# Patient Record
Sex: FEMALE | Race: WHITE | NOT HISPANIC OR LATINO | Employment: UNEMPLOYED | URBAN - METROPOLITAN AREA
[De-identification: names, ages, dates, MRNs, and addresses within clinical notes are randomized per-mention and may not be internally consistent; named-entity substitution may affect disease eponyms.]

---

## 2018-01-29 ENCOUNTER — OFFICE VISIT (OUTPATIENT)
Dept: INTERNAL MEDICINE | Facility: CLINIC | Age: 68
End: 2018-01-29
Payer: COMMERCIAL

## 2018-01-29 VITALS
HEIGHT: 68 IN | RESPIRATION RATE: 18 BRPM | TEMPERATURE: 98 F | WEIGHT: 134 LBS | OXYGEN SATURATION: 98 % | DIASTOLIC BLOOD PRESSURE: 72 MMHG | SYSTOLIC BLOOD PRESSURE: 110 MMHG | HEART RATE: 86 BPM | BODY MASS INDEX: 20.31 KG/M2

## 2018-01-29 DIAGNOSIS — M89.9 BONE DISEASE: Primary | ICD-10-CM

## 2018-01-29 DIAGNOSIS — Z12.11 SCREEN FOR COLON CANCER: ICD-10-CM

## 2018-01-29 DIAGNOSIS — F90.1 ATTENTION DEFICIT HYPERACTIVITY DISORDER (ADHD), PREDOMINANTLY HYPERACTIVE TYPE: ICD-10-CM

## 2018-01-29 DIAGNOSIS — R53.83 OTHER FATIGUE: ICD-10-CM

## 2018-01-29 DIAGNOSIS — Z00.00 ROUTINE MEDICAL EXAM: ICD-10-CM

## 2018-01-29 DIAGNOSIS — G44.229 CHRONIC TENSION-TYPE HEADACHE, NOT INTRACTABLE: ICD-10-CM

## 2018-01-29 DIAGNOSIS — Z00.00 GENERAL MEDICAL EXAM: ICD-10-CM

## 2018-01-29 DIAGNOSIS — E03.9 ACQUIRED HYPOTHYROIDISM: ICD-10-CM

## 2018-01-29 DIAGNOSIS — F06.4 ANXIETY DISORDER DUE TO KNOWN PHYSIOLOGICAL CONDITION: ICD-10-CM

## 2018-01-29 DIAGNOSIS — Z78.9 STATIN INTOLERANCE: ICD-10-CM

## 2018-01-29 DIAGNOSIS — E78.2 MIXED HYPERLIPIDEMIA: ICD-10-CM

## 2018-01-29 DIAGNOSIS — F51.01 PRIMARY INSOMNIA: ICD-10-CM

## 2018-01-29 DIAGNOSIS — I95.1 ORTHOSTASIS: ICD-10-CM

## 2018-01-29 DIAGNOSIS — Z12.39 BREAST CANCER SCREENING: ICD-10-CM

## 2018-01-29 PROCEDURE — 99213 OFFICE O/P EST LOW 20 MIN: CPT | Mod: ,,, | Performed by: INTERNAL MEDICINE

## 2018-01-29 RX ORDER — DEXTROAMPHETAMINE SACCHARATE, AMPHETAMINE ASPARTATE, DEXTROAMPHETAMINE SULFATE AND AMPHETAMINE SULFATE 5; 5; 5; 5 MG/1; MG/1; MG/1; MG/1
2 TABLET ORAL 2 TIMES DAILY
COMMUNITY
Start: 2018-01-18 | End: 2018-01-29 | Stop reason: SDUPTHER

## 2018-01-29 RX ORDER — LORAZEPAM 2 MG/1
TABLET ORAL
Refills: 5 | COMMUNITY
Start: 2018-01-16 | End: 2019-07-25

## 2018-01-29 RX ORDER — ALPRAZOLAM 2 MG/1
TABLET ORAL
COMMUNITY
Start: 2018-01-16 | End: 2018-06-04

## 2018-01-29 RX ORDER — CEVIMELINE HYDROCHLORIDE 30 MG/1
CAPSULE ORAL DAILY
Refills: 0 | COMMUNITY
Start: 2017-12-27

## 2018-01-29 RX ORDER — ZOLPIDEM TARTRATE 3.5 MG/1
TABLET SUBLINGUAL
Refills: 0 | COMMUNITY
Start: 2017-12-27 | End: 2018-06-04

## 2018-01-29 RX ORDER — DEXTROAMPHETAMINE SACCHARATE, AMPHETAMINE ASPARTATE, DEXTROAMPHETAMINE SULFATE AND AMPHETAMINE SULFATE 5; 5; 5; 5 MG/1; MG/1; MG/1; MG/1
2 TABLET ORAL 2 TIMES DAILY
Qty: 120 TABLET | Refills: 0 | Status: SHIPPED | OUTPATIENT
Start: 2018-01-29 | End: 2023-06-04

## 2018-01-29 RX ORDER — MIDODRINE HYDROCHLORIDE 5 MG/1
5 TABLET ORAL 2 TIMES DAILY WITH MEALS
Refills: 0 | COMMUNITY
Start: 2017-12-06 | End: 2019-07-25

## 2018-01-29 RX ORDER — CYANOCOBALAMIN 1000 UG/ML
INJECTION, SOLUTION INTRAMUSCULAR; SUBCUTANEOUS
Refills: 0 | COMMUNITY
Start: 2017-12-05 | End: 2019-05-30

## 2018-01-29 RX ORDER — BUTALBITAL, ACETAMINOPHEN AND CAFFEINE 300; 40; 50 MG/1; MG/1; MG/1
CAPSULE ORAL
COMMUNITY
End: 2018-06-04

## 2018-01-29 RX ORDER — TOPIRAMATE 100 MG/1
50 TABLET, FILM COATED ORAL NIGHTLY
Refills: 0 | COMMUNITY
Start: 2018-01-08 | End: 2019-10-24

## 2018-01-29 RX ORDER — CEFUROXIME AXETIL 250 MG/1
TABLET ORAL DAILY PRN
Refills: 0 | COMMUNITY
Start: 2017-12-27

## 2018-01-29 NOTE — PROGRESS NOTES
SUBJECTIVE:    Patient ID: Norris Frank is a 67 y.o. female.    Chief Complaint: Follow-up (3 months) and Medication Refill    HPI     IN FOR RECHECK-HISTORY OF ADHD WITH SEVERE FATIGUE--SINCE LAST OV SHE STARTED HORMONES-TESTOSTERONE AND SHE HAS FELT BETTER AND BEEN MORE ACTIVE-SHE HAS DECREASED THE ADERALL FROM 120 MG TO 80-SHE SAYS SHE MIGHT BE ABLE TO GO DOWN ANOTHER 20 BUT SHE IS NOT SURE-    SHE IS WORKING OUT WITH A [employer] AND HE IS WORKING ON HIS PHD IN NUTRITION-SHE IS VERY INVOLVED IN THE TREATMENT    Past Medical History:   Diagnosis Date    ADHD (attention deficit hyperactivity disorder)     Hypothyroidism      Social History     Social History    Marital status: Single     Spouse name: N/A    Number of children: N/A    Years of education: N/A     Occupational History    Not on file.     Social History Main Topics    Smoking status: Never Smoker    Smokeless tobacco: Never Used    Alcohol use No    Drug use: No    Sexual activity: Not on file     Other Topics Concern    Not on file     Social History Narrative    No narrative on file     Past Surgical History:   Procedure Laterality Date     SECTION      CHOLECYSTECTOMY       History reviewed. No pertinent family history.    Review of Systems   Constitutional: Negative for appetite change, chills, diaphoresis, fatigue, fever and unexpected weight change.   HENT: Negative for congestion, ear pain, hearing loss, nosebleeds, postnasal drip, sinus pain, sinus pressure, sneezing, sore throat, tinnitus, trouble swallowing and voice change.    Eyes: Negative for photophobia, pain, itching and visual disturbance.   Respiratory: Negative for apnea, cough, chest tightness, shortness of breath, wheezing and stridor.    Cardiovascular: Negative for chest pain, palpitations and leg swelling.   Gastrointestinal: Negative for abdominal distention, abdominal pain, blood in stool, constipation, diarrhea, nausea and vomiting.   Endocrine:  Negative for cold intolerance, heat intolerance, polydipsia and polyuria.   Genitourinary: Negative for difficulty urinating, dyspareunia, dysuria, flank pain, frequency, hematuria, menstrual problem, pelvic pain, urgency, vaginal discharge and vaginal pain.   Musculoskeletal: Negative for arthralgias, back pain, joint swelling, myalgias, neck pain and neck stiffness.   Skin: Negative for pallor.   Allergic/Immunologic: Negative for environmental allergies and food allergies.   Neurological: Negative for dizziness, tremors, speech difficulty, weakness, light-headedness and numbness.   Hematological: Does not bruise/bleed easily.   Psychiatric/Behavioral: Negative for agitation, confusion, decreased concentration, sleep disturbance and suicidal ideas. The patient is not nervous/anxious.           Objective:      Physical Exam   Constitutional: She is oriented to person, place, and time. She appears well-developed and well-nourished. She is cooperative. No distress.   HENT:   Head: Normocephalic and atraumatic.   Right Ear: Tympanic membrane normal.   Left Ear: Tympanic membrane normal.   Mouth/Throat: Uvula is midline and mucous membranes are normal.   Eyes: Conjunctivae, EOM and lids are normal. Pupils are equal, round, and reactive to light. Right pupil is round and reactive. Left pupil is round and reactive.   Neck: Trachea normal and normal range of motion. Neck supple. No JVD present. No thyromegaly present.   Cardiovascular: Normal rate, regular rhythm, normal heart sounds and intact distal pulses.    Pulmonary/Chest: Effort normal and breath sounds normal. No tachypnea. No respiratory distress.   Abdominal: Soft. Bowel sounds are normal. There is no tenderness.   Musculoskeletal: Normal range of motion.   Lymphadenopathy:     She has no cervical adenopathy.   Neurological: She is alert and oriented to person, place, and time. She has normal strength.   Skin: Skin is warm and dry. No rash noted.   Psychiatric:  She has a normal mood and affect. Her speech is normal.   Nursing note and vitals reviewed.          Assessment:       1. Bone disease    2. Statin intolerance    3. Breast cancer screening    4. General medical exam    5. Mixed hyperlipidemia    6. Acquired hypothyroidism    7. Screen for colon cancer    8. Routine medical exam    9. Anxiety disorder due to known physiological condition    10. Chronic tension-type headache, not intractable    11. Other fatigue    12. Attention deficit hyperactivity disorder (ADHD), predominantly hyperactive type    13. Orthostasis    14. Primary insomnia         Plan:           Bone disease  -     DXA Bone Density Spine And Hip    Statin intolerance    Breast cancer screening  -     Mammo Digital Screening Bilat without CA    General medical exam  -     CBC auto differential; Future; Expected date: 02/05/2018  -     Comprehensive metabolic panel; Future; Expected date: 02/05/2018  -     Lipid panel; Future; Expected date: 02/05/2018    Mixed hyperlipidemia  -     Lipid panel; Future; Expected date: 02/05/2018    Acquired hypothyroidism  -     TSH; Future; Expected date: 02/05/2018    Screen for colon cancer  -     Occult blood x 1, stool; Future; Expected date: 02/05/2018    Routine medical exam  -     Urinalysis; Future; Expected date: 02/05/2018    Anxiety disorder due to known physiological condition    Chronic tension-type headache, not intractable    Other fatigue    Attention deficit hyperactivity disorder (ADHD), predominantly hyperactive type  -     dextroamphetamine-amphetamine (ADDERALL) 20 mg tablet; Take 2 tablets by mouth 2 (two) times daily.  Dispense: 120 tablet; Refill: 0    Orthostasis    Primary insomnia

## 2018-06-04 ENCOUNTER — OFFICE VISIT (OUTPATIENT)
Dept: INTERNAL MEDICINE | Facility: CLINIC | Age: 68
End: 2018-06-04
Payer: COMMERCIAL

## 2018-06-04 VITALS
RESPIRATION RATE: 16 BRPM | HEART RATE: 78 BPM | TEMPERATURE: 98 F | WEIGHT: 129 LBS | HEIGHT: 68 IN | DIASTOLIC BLOOD PRESSURE: 68 MMHG | OXYGEN SATURATION: 98 % | BODY MASS INDEX: 19.55 KG/M2 | SYSTOLIC BLOOD PRESSURE: 100 MMHG

## 2018-06-04 DIAGNOSIS — Z83.49 FAMILY HISTORY OF ADDISON'S DISEASE: ICD-10-CM

## 2018-06-04 DIAGNOSIS — R63.4 WEIGHT LOSS: Primary | ICD-10-CM

## 2018-06-04 DIAGNOSIS — E78.2 MIXED HYPERLIPIDEMIA: ICD-10-CM

## 2018-06-04 DIAGNOSIS — R23.3 EASY BRUISING: ICD-10-CM

## 2018-06-04 DIAGNOSIS — R53.83 FATIGUE, UNSPECIFIED TYPE: ICD-10-CM

## 2018-06-04 DIAGNOSIS — Z80.0 FAMILY HISTORY OF COLON CANCER: ICD-10-CM

## 2018-06-04 DIAGNOSIS — Z86.79 HISTORY OF ORTHOSTATIC HYPOTENSION: ICD-10-CM

## 2018-06-04 PROCEDURE — 99214 OFFICE O/P EST MOD 30 MIN: CPT | Mod: ,,, | Performed by: INTERNAL MEDICINE

## 2018-06-04 RX ORDER — HYDROCORTISONE 5 MG/1
5 TABLET ORAL 2 TIMES DAILY
COMMUNITY
End: 2019-07-25

## 2018-06-04 RX ORDER — MULTIVITAMIN
1 TABLET ORAL DAILY
COMMUNITY

## 2018-06-04 RX ORDER — DESVENLAFAXINE 100 MG/1
100 TABLET, EXTENDED RELEASE ORAL DAILY
COMMUNITY
End: 2019-05-30

## 2018-06-04 RX ORDER — HYDROXYZINE PAMOATE 50 MG/1
100 CAPSULE ORAL DAILY
Status: ON HOLD | COMMUNITY
End: 2018-08-21 | Stop reason: CLARIF

## 2018-06-04 RX ORDER — CALCIUM CARBONATE 600 MG
600 TABLET ORAL ONCE
Status: ON HOLD | COMMUNITY
End: 2023-04-07

## 2018-06-04 RX ORDER — VENLAFAXINE 37.5 MG/1
37.5 TABLET ORAL DAILY
COMMUNITY
End: 2019-05-30

## 2018-06-04 NOTE — PROGRESS NOTES
SUBJECTIVE:    Patient ID: Norris Frank is a 68 y.o. female.    Chief Complaint: Weight Loss; Orders; and Anorexia    HPI     Patient comes in for recheck    Weight loss-recent-she was down to 125 consistently-she has obvious bruising on the arms she says is improving-says her father had bruising for years-but this started suddenly-she started noticing slow wound healing-never before ( has been noncompliant in the past to orders given for labs-explained she HAS to have these labs, particularly now)-she has had a bit of fatigue which comes and goes-she stopped playing tennis last week-stopped weight lifting last week also-suddenly she stopped-loss of appetite is strange- gets nauseated after she eats-on several occasions nausea led to need to go to bed-had slight lower abdominal cramping intermittently-notices no blood in stool and stool is not dark-no recent RT sx-she thinks she is headed towards one of two issues in her family-colon cancer-[place]' which involve second cousins-25 years ago she developed severe malnutrition associated with abdominal protuberance-she was treated in [place] -they found ablution due decreased pancreatic enzymes and adrenal insufficiency-At this time she does not eat due to nausea after eating-she wants to eat -just cannot  Past Medical History:   Diagnosis Date    ADHD (attention deficit hyperactivity disorder)     Hypothyroidism      Social History     Social History    Marital status:      Spouse name: N/A    Number of children: N/A    Years of education: N/A     Occupational History    Not on file.     Social History Main Topics    Smoking status: Never Smoker    Smokeless tobacco: Never Used    Alcohol use No    Drug use: No    Sexual activity: Not on file     Other Topics Concern    Not on file     Social History Narrative    No narrative on file     Past Surgical History:   Procedure Laterality Date     SECTION      CHOLECYSTECTOMY        History reviewed. No pertinent family history.    Review of Systems   Constitutional: Negative for appetite change, chills, diaphoresis, fatigue, fever and unexpected weight change.   HENT: Negative for congestion, ear pain, hearing loss, nosebleeds, postnasal drip, sinus pain, sinus pressure, sneezing, sore throat, tinnitus, trouble swallowing and voice change.    Eyes: Negative for photophobia, pain, itching and visual disturbance.   Respiratory: Negative for apnea, cough, chest tightness, shortness of breath, wheezing and stridor.    Cardiovascular: Negative for chest pain, palpitations and leg swelling.        She is now on midodrine-due to orthostatic hypotension   Gastrointestinal: Negative for abdominal distention, abdominal pain, blood in stool, constipation, diarrhea, nausea and vomiting.   Endocrine: Negative for cold intolerance, heat intolerance, polydipsia and polyuria.        BP is low   Genitourinary: Negative for difficulty urinating, dyspareunia, dysuria, flank pain, frequency, hematuria, menstrual problem, pelvic pain, urgency, vaginal discharge and vaginal pain.   Musculoskeletal: Negative for arthralgias, back pain, joint swelling, myalgias, neck pain and neck stiffness.   Skin: Negative for pallor.   Allergic/Immunologic: Negative for environmental allergies and food allergies.   Neurological: Negative for dizziness, tremors, speech difficulty, weakness (no stamina), light-headedness and numbness.   Hematological: Bruises/bleeds easily (6 months).   Psychiatric/Behavioral: Negative for agitation, confusion, decreased concentration, sleep disturbance and suicidal ideas. The patient is not nervous/anxious.         ADHD with hx severe fatigue-started cortef-by            Objective:      Physical Exam   Constitutional: She is oriented to person, place, and time. She appears well-developed. She is cooperative. No distress.   thin   HENT:   Head: Normocephalic and atraumatic.   Right Ear:  Tympanic membrane normal.   Left Ear: Tympanic membrane normal.   Mouth/Throat: Uvula is midline and mucous membranes are normal.   Eyes: Conjunctivae, EOM and lids are normal. Pupils are equal, round, and reactive to light. Right pupil is round and reactive. Left pupil is round and reactive.   Neck: Trachea normal and normal range of motion. Neck supple. No JVD present. No thyromegaly present.   Cardiovascular: Normal rate, regular rhythm, normal heart sounds and intact distal pulses.    Pulmonary/Chest: Effort normal and breath sounds normal. No tachypnea. No respiratory distress.   Abdominal: Soft. Bowel sounds are normal. There is no tenderness.   Musculoskeletal: Normal range of motion.   Lymphadenopathy:     She has no cervical adenopathy.   Neurological: She is alert and oriented to person, place, and time. She has normal strength.   Skin: Skin is warm and dry. No rash noted.   Psychiatric: She has a normal mood and affect. Her speech is normal.   Nursing note and vitals reviewed.          Assessment:       1. Weight loss    2. Easy bruising    3. Family history of colon cancer    4. Family history of Roby's disease    5. History of orthostatic hypotension    6. Mixed hyperlipidemia    7. Fatigue, unspecified type         Plan:           Weight loss  -     Ambulatory referral to Gastroenterology  -     CBC auto differential; Future; Expected date: 06/04/2018  -     Comprehensive metabolic panel; Future; Expected date: 06/04/2018  -     TSH; Future; Expected date: 06/04/2018  -     Urinalysis Microscopic  -     Sedimentation rate, automated; Future; Expected date: 06/04/2018  -     C-reactive protein; Future; Expected date: 06/04/2018  -     ACTH; Future; Expected date: 06/04/2018  -     X-Ray Chest PA And Lateral  -     Vitamin D; Future; Expected date: 06/05/2018  -     Amylase; Future; Expected date: 06/04/2018  -     Lipase; Future; Expected date: 06/04/2018    Easy bruising  -     Ambulatory referral  to Gastroenterology  -     CBC auto differential; Future; Expected date: 06/04/2018  -     Comprehensive metabolic panel; Future; Expected date: 06/04/2018  -     Sedimentation rate, automated; Future; Expected date: 06/04/2018  -     C-reactive protein; Future; Expected date: 06/04/2018  -     ACTH; Future; Expected date: 06/04/2018  -     Vitamin D; Future; Expected date: 06/05/2018    Family history of colon cancer  -     Ambulatory referral to Gastroenterology    Family history of Roby's disease  -     CBC auto differential; Future; Expected date: 06/04/2018  -     Cortisol, 8AM; Future; Expected date: 06/05/2018  -     Cortisol, 4PM; Future; Expected date: 06/05/2018  -     Cortisol, urine, free [facility]); 24 Hours; Future; Expected date: 06/05/2018    History of orthostatic hypotension  -     CBC auto differential; Future; Expected date: 06/04/2018  -     Comprehensive metabolic panel; Future; Expected date: 06/04/2018    Mixed hyperlipidemia  -     Lipid panel; Future; Expected date: 06/04/2018    Fatigue, unspecified type  -     Vitamin D; Future; Expected date: 06/05/2018

## 2018-06-15 LAB
ALBUMIN SERPL-MCNC: 4.3 G/DL (ref 3.1–4.7)
ALP SERPL-CCNC: 77 IU/L (ref 40–104)
ALT (SGPT): 22 IU/L (ref 3–33)
AMYLASE SERPL-CCNC: 70 U/L (ref 28–100)
AST SERPL-CCNC: 25 IU/L (ref 10–40)
BASOPHILS NFR BLD: 0 K/UL (ref 0–0.2)
BASOPHILS NFR BLD: 0.6 %
BILIRUB SERPL-MCNC: 0.3 MG/DL (ref 0.3–1)
BUN SERPL-MCNC: 17 MG/DL (ref 8–20)
CALCIUM SERPL-MCNC: 9 MG/DL (ref 7.7–10.4)
CHLORIDE: 105 MMOL/L (ref 98–110)
CO2 SERPL-SCNC: 27.4 MMOL/L (ref 22.8–31.6)
CREATININE: 0.8 MG/DL (ref 0.6–1.4)
CRP SERPL-MCNC: 0.78 MG/DL (ref 0–1.4)
EOSINOPHIL NFR BLD: 0.2 K/UL (ref 0–0.7)
EOSINOPHIL NFR BLD: 5.4 %
ERYTHROCYTE [DISTWIDTH] IN BLOOD BY AUTOMATED COUNT: 13.4 % (ref 12.5–14.5)
GLUCOSE: 102 MG/DL (ref 70–99)
GRAN #: 1 K/UL (ref 1.4–6.5)
GRAN%: 29.7 %
HCT VFR BLD AUTO: 37 % (ref 36–48)
HGB BLD-MCNC: 12.4 G/DL (ref 12–15)
IMMATURE GRANS (ABS): 0 K/UL (ref 0–1)
IMMATURE GRANULOCYTES: 0.6 %
LIPASE SERPL-CCNC: 20 U/L (ref 0–38)
LYMPH #: 2 K/UL (ref 1.2–3.4)
LYMPH%: 56.3 %
MCH RBC QN AUTO: 33.1 PG (ref 25–35)
MCHC RBC AUTO-ENTMCNC: 33.5 G/DL (ref 31–36)
MCV RBC AUTO: 98.7 FL (ref 79–98)
MONO #: 0.3 K/UL (ref 0.1–0.6)
MONO%: 7.4 %
NUCLEATED RBCS: 0 %
NUCLEATED RED BLOOD CELLS: 0 /100 WBC
PERFORMED BY:: ABNORMAL
PLATELET # BLD AUTO: 105 K/UL (ref 140–440)
PMV BLD AUTO: 11 FL (ref 8.8–12.7)
POTASSIUM SERPL-SCNC: 3.7 MMOL/L (ref 3.5–5)
PROT SERPL-MCNC: 7.1 G/DL (ref 6–8.2)
RBC # BLD AUTO: 3.75 M/UL (ref 3.5–5.5)
SODIUM: 140 MMOL/L (ref 134–144)
TSH SERPL DL<=0.005 MIU/L-ACNC: 2.4 ULU/ML (ref 0.3–5.6)
VITAMIN D, 1,25 (OH)2: 55.6 NG/ML (ref 30–100)
WBC # BLD: 3.5 K/UL (ref 5–10)

## 2018-06-20 ENCOUNTER — TELEPHONE (OUTPATIENT)
Dept: INTERNAL MEDICINE | Facility: CLINIC | Age: 68
End: 2018-06-20

## 2018-06-20 NOTE — TELEPHONE ENCOUNTER
----- Message from Brie Dhillon MD sent at 6/17/2018 10:47 AM CDT -----  Please call the patient regarding her abnormal result.-esr is normal--as is vit d and TSH--CRP is normal--cholesterol is elevated and according to AHA and ADA the LDL should be lowered-recommend Pravastatin 40 daily and recheck LIPIDS and HP FUNCTION in 6 months and LOW FAT DIET

## 2018-07-03 ENCOUNTER — OFFICE VISIT (OUTPATIENT)
Dept: INTERNAL MEDICINE | Facility: CLINIC | Age: 68
End: 2018-07-03
Payer: COMMERCIAL

## 2018-07-03 VITALS
OXYGEN SATURATION: 98 % | WEIGHT: 129 LBS | HEART RATE: 62 BPM | BODY MASS INDEX: 19.55 KG/M2 | HEIGHT: 68 IN | SYSTOLIC BLOOD PRESSURE: 118 MMHG | RESPIRATION RATE: 16 BRPM | DIASTOLIC BLOOD PRESSURE: 66 MMHG | TEMPERATURE: 98 F

## 2018-07-03 DIAGNOSIS — D72.820 LYMPHOCYTOSIS: ICD-10-CM

## 2018-07-03 DIAGNOSIS — D70.9 NEUTROPENIA, UNSPECIFIED TYPE: ICD-10-CM

## 2018-07-03 DIAGNOSIS — Z11.59 NEED FOR HEPATITIS C SCREENING TEST: ICD-10-CM

## 2018-07-03 DIAGNOSIS — R63.4 UNEXPLAINED WEIGHT LOSS: Primary | ICD-10-CM

## 2018-07-03 DIAGNOSIS — D69.6 THROMBOCYTOPENIA: ICD-10-CM

## 2018-07-03 PROCEDURE — 99214 OFFICE O/P EST MOD 30 MIN: CPT | Mod: ,,, | Performed by: INTERNAL MEDICINE

## 2018-07-03 NOTE — PROGRESS NOTES
"  SUBJECTIVE:    Patient ID: Norris Frank is a 68 y.o. female.    Chief Complaint: Weight Loss and Follow-up    HPI     Patient comes in for recheck--her last labs revealed a high cholesterol and LDL of 164--she had tried a number of statins for same and had had undesirable side effects of same--her mother is 94 years old and has chol of over 400 pounds---    My concern is as to the nature of her weight loss--WBC is low and there is a lymphocytosis-she does not want to pursue this at this time-GI is concerned re her weight loss and wants to do a colonoscopy, which I feel is necessary-she will have the procedure --but she says she wants to stop there and wants to see what the next few weeks bring-she will agree to see Dr Bailey -    She is osteopenic with T score of -1.8 --she is in the midst of dental proceeding involving removal of teeth-apparently there was some problems with the dentist who was treating her-        Past Medical History:   Diagnosis Date    ADHD (attention deficit hyperactivity disorder)     Hypothyroidism      Social History     Social History    Marital status:      Spouse name: N/A    Number of children: N/A    Years of education: N/A     Occupational History    Not on file.     Social History Main Topics    Smoking status: Never Smoker    Smokeless tobacco: Never Used    Alcohol use No    Drug use: No    Sexual activity: Not on file     Other Topics Concern    Not on file     Social History Narrative    No narrative on file     Past Surgical History:   Procedure Laterality Date     SECTION      CHOLECYSTECTOMY       History reviewed. No pertinent family history.  Vitals:    18 1752   BP: 118/66   Pulse: 62   Resp: 16   Temp: 97.8 °F (36.6 °C)   SpO2: 98%   Weight: 58.5 kg (129 lb)   Height: 5' 8" (1.727 m)       Review of Systems   Constitutional: Positive for unexpected weight change (ongoing weight loss). Negative for appetite change, chills, " diaphoresis, fatigue and fever.   HENT: Negative for congestion, ear pain, hearing loss, nosebleeds, postnasal drip, sinus pain, sinus pressure, sneezing, sore throat, tinnitus, trouble swallowing and voice change.    Eyes: Negative for photophobia, pain, itching and visual disturbance.   Respiratory: Negative for apnea, cough, chest tightness, shortness of breath, wheezing and stridor.    Cardiovascular: Negative for chest pain, palpitations and leg swelling.   Gastrointestinal: Negative for abdominal distention, abdominal pain, blood in stool, constipation, diarrhea, nausea and vomiting.        Irregular BM's   Endocrine: Negative for cold intolerance, heat intolerance, polydipsia and polyuria.   Genitourinary: Negative for difficulty urinating, dyspareunia, dysuria, flank pain, frequency, hematuria, menstrual problem, pelvic pain, urgency, vaginal discharge and vaginal pain.   Musculoskeletal: Negative for arthralgias, back pain, joint swelling, myalgias, neck pain and neck stiffness.   Skin: Negative for pallor.   Allergic/Immunologic: Negative for environmental allergies and food allergies.   Neurological: Negative for dizziness, tremors, speech difficulty, weakness, light-headedness and numbness.   Hematological: Does not bruise/bleed easily.   Psychiatric/Behavioral: Negative for agitation, confusion, decreased concentration, sleep disturbance and suicidal ideas. The patient is not nervous/anxious.           Objective:      Physical Exam   Constitutional: She is oriented to person, place, and time. She appears well-developed. She is cooperative. No distress.   Very thin   HENT:   Head: Normocephalic and atraumatic.   Right Ear: Tympanic membrane normal.   Left Ear: Tympanic membrane normal.   Mouth/Throat: Uvula is midline and mucous membranes are normal.   Eyes: Conjunctivae, EOM and lids are normal. Pupils are equal, round, and reactive to light. Right pupil is round and reactive. Left pupil is round and  reactive.   Neck: Trachea normal and normal range of motion. Neck supple. No JVD present. No thyromegaly present.   Cardiovascular: Normal rate, regular rhythm, normal heart sounds and intact distal pulses.    Pulmonary/Chest: Effort normal and breath sounds normal. No tachypnea. No respiratory distress.   Abdominal: Soft. Bowel sounds are normal. There is no tenderness.   Some shotty nodes in the groin   Musculoskeletal: Normal range of motion.   Lymphadenopathy:     She has no cervical adenopathy.   Neurological: She is alert and oriented to person, place, and time. She has normal strength.   Skin: Skin is warm and dry. No rash noted.   Psychiatric: She has a normal mood and affect. Her speech is normal.   Very aggitated   Nursing note and vitals reviewed.          Assessment:       1. Unexplained weight loss    2. Neutropenia, unspecified type    3. Lymphocytosis    4. Thrombocytopenia    5. Need for hepatitis C screening test         Plan:           Unexplained weight loss    Neutropenia, unspecified type  -     Ambulatory referral to Hematology / Oncology    Lymphocytosis  -     Ambulatory referral to Hematology / Oncology    Thrombocytopenia  -     Ambulatory referral to Hematology / Oncology    Need for hepatitis C screening test  -     Hepatitis C antibody; Future; Expected date: 07/03/2018

## 2018-08-21 PROBLEM — D69.6 THROMBOCYTOPENIA: Status: ACTIVE | Noted: 2018-08-21

## 2019-05-30 ENCOUNTER — OFFICE VISIT (OUTPATIENT)
Dept: ORTHOPEDICS | Facility: CLINIC | Age: 69
End: 2019-05-30
Payer: COMMERCIAL

## 2019-05-30 VITALS
SYSTOLIC BLOOD PRESSURE: 98 MMHG | DIASTOLIC BLOOD PRESSURE: 60 MMHG | BODY MASS INDEX: 18.44 KG/M2 | WEIGHT: 136 LBS | HEART RATE: 81 BPM

## 2019-05-30 DIAGNOSIS — R07.81 RIB PAIN ON LEFT SIDE: ICD-10-CM

## 2019-05-30 DIAGNOSIS — S22.088A OTHER CLOSED FRACTURE OF TWELFTH THORACIC VERTEBRA, INITIAL ENCOUNTER: ICD-10-CM

## 2019-05-30 DIAGNOSIS — M54.9 MID BACK PAIN: ICD-10-CM

## 2019-05-30 DIAGNOSIS — M51.9 THORACIC DISC DISEASE: ICD-10-CM

## 2019-05-30 DIAGNOSIS — S32.010A CLOSED COMPRESSION FRACTURE OF FIRST LUMBAR VERTEBRA, INITIAL ENCOUNTER: Primary | ICD-10-CM

## 2019-05-30 PROCEDURE — 72170 PR  X-RAY PELVIS 1/2 VW: ICD-10-PCS | Mod: ,,, | Performed by: ORTHOPAEDIC SURGERY

## 2019-05-30 PROCEDURE — 1101F PT FALLS ASSESS-DOCD LE1/YR: CPT | Mod: ,,, | Performed by: ORTHOPAEDIC SURGERY

## 2019-05-30 PROCEDURE — 99203 OFFICE O/P NEW LOW 30 MIN: CPT | Mod: 25,,, | Performed by: ORTHOPAEDIC SURGERY

## 2019-05-30 PROCEDURE — 99203 PR OFFICE/OUTPT VISIT, NEW, LEVL III, 30-44 MIN: ICD-10-PCS | Mod: 25,,, | Performed by: ORTHOPAEDIC SURGERY

## 2019-05-30 PROCEDURE — 71100 X-RAY EXAM RIBS UNI 2 VIEWS: CPT | Mod: ,,, | Performed by: ORTHOPAEDIC SURGERY

## 2019-05-30 PROCEDURE — 1101F PR PT FALLS ASSESS DOC 0-1 FALLS W/OUT INJ PAST YR: ICD-10-PCS | Mod: ,,, | Performed by: ORTHOPAEDIC SURGERY

## 2019-05-30 PROCEDURE — 72070 PR  X-RAY THORACIC SPINE 2 VW: ICD-10-PCS | Mod: ,,, | Performed by: ORTHOPAEDIC SURGERY

## 2019-05-30 PROCEDURE — 72170 X-RAY EXAM OF PELVIS: CPT | Mod: ,,, | Performed by: ORTHOPAEDIC SURGERY

## 2019-05-30 PROCEDURE — 72100 X-RAY EXAM L-S SPINE 2/3 VWS: CPT | Mod: ,,, | Performed by: ORTHOPAEDIC SURGERY

## 2019-05-30 PROCEDURE — 72100 PR  X-RAY LUMBAR SPINE 2/3 VW: ICD-10-PCS | Mod: ,,, | Performed by: ORTHOPAEDIC SURGERY

## 2019-05-30 PROCEDURE — 72070 X-RAY EXAM THORAC SPINE 2VWS: CPT | Mod: ,,, | Performed by: ORTHOPAEDIC SURGERY

## 2019-05-30 PROCEDURE — 71100 PR X-RAY RIBS 2 VW UNILAT: ICD-10-PCS | Mod: ,,, | Performed by: ORTHOPAEDIC SURGERY

## 2019-05-30 RX ORDER — GABAPENTIN 300 MG/1
600 CAPSULE ORAL 2 TIMES DAILY
Refills: 6 | COMMUNITY
Start: 2019-05-04 | End: 2019-07-25

## 2019-05-30 RX ORDER — ALPRAZOLAM 1 MG/1
TABLET ORAL
Refills: 5 | COMMUNITY
Start: 2019-05-16 | End: 2019-10-24

## 2019-05-30 RX ORDER — CYCLOBENZAPRINE HCL 10 MG
TABLET ORAL
Refills: 1 | COMMUNITY
Start: 2019-05-26 | End: 2019-07-25

## 2019-05-30 RX ORDER — ZOLPIDEM TARTRATE 10 MG/1
TABLET ORAL
Refills: 3 | Status: ON HOLD | COMMUNITY
Start: 2019-05-04 | End: 2023-04-07

## 2019-05-30 RX ORDER — HYDROXYZINE PAMOATE 50 MG/1
CAPSULE ORAL
Refills: 2 | COMMUNITY
Start: 2019-05-13 | End: 2019-07-25

## 2019-05-30 RX ORDER — THIORIDAZINE HYDROCHLORIDE 25 MG/1
TABLET, FILM COATED ORAL
Refills: 4 | COMMUNITY
Start: 2019-05-28 | End: 2019-10-24

## 2019-05-30 NOTE — PROGRESS NOTES
[facility]    Subjective:     Chief Complaint:   Chief Complaint   Patient presents with    Lumbar Spine - Pain     She fell 6ft off of a ladder in the bathroom at home  and landed right on her lumbar. She has no leg pain. More severe on right side.    Thoracic Spine - Pain     She has a lot of thoracic pain and this is where she mostly landed with rib pain. Denies any neck pain       Past Medical History:   Diagnosis Date    ADHD (attention deficit hyperactivity disorder)     Hypothyroidism        Past Surgical History:   Procedure Laterality Date    Biopsy-bone marrow N/A 2018    Performed by Antonio [facility]ou[facility] MD at [facility]     SECTION      CHOLECYSTECTOMY      FACIAL RECONSTRUCTION SURGERY         Current Outpatient Medications   Medication Sig    ALPRAZolam (XANAX) 2 MG Tab TK 1 T PO TID PRN    calcium carbonate (OS-SUSSY) 600 mg calcium (1,500 mg) Tab Take 600 mg by mouth once.    cevimeline (EVOXAC) 30 mg capsule     cyclobenzaprine (FLEXERIL) 10 MG tablet TK 1 T PO  QD    dextroamphetamine-amphetamine (ADDERALL) 20 mg tablet Take 2 tablets by mouth 2 (two) times daily.    gabapentin (NEURONTIN) 300 MG capsule Take 600 mg by mouth 2 (two) times daily.    hydrOXYzine pamoate (VISTARIL) 50 MG Cap TK ONE C PO  QHS    Lactobacillus rhamnosus GG (CULTURELLE) 10 billion cell capsule Take 1 capsule by mouth once daily.    LORazepam (ATIVAN) 2 MG Tab TK 1 T PO TID PRN    midodrine (PROAMATINE) 5 MG Tab Take 5 mg by mouth 2 (two) times daily with meals.     multivitamin (ONE DAILY MULTIVITAMIN) per tablet Take 1 tablet by mouth once daily.    sumatriptan (IMITREX STATDOSE) 6 mg/0.5 mL kit     testosterone 50 mg pellet Inject 175 mg into the muscle.    thioridazine (MELLARIL) 25 MG tablet TK 4 TS PO HS    topiramate (TOPAMAX) 50 MG tablet Take 50 mg by mouth 3 (three) times daily.     zolpidem (AMBIEN) 10 mg Tab TK 1 TO 2 TS PO QHS PRN    hydrocortisone (CORTEF) 5 MG  Tab Take 5 mg by mouth 2 (two) times daily.     No current facility-administered medications for this visit.        Review of patient's allergies indicates:   Allergen Reactions    Livalo [pitavastatin calcium]     Percocet [oxycodone-acetaminophen]        Family History   Problem Relation Age of Onset    Hypertension Mother     Cancer Father     Lung cancer Maternal Aunt        Social History     Socioeconomic History    Marital status:      Spouse name: Not on file    Number of children: Not on file    Years of education: Not on file    Highest education level: Not on file   Occupational History    Not on file   Social Needs    Financial resource strain: Not on file    Food insecurity:     Worry: Not on file     Inability: Not on file    Transportation needs:     Medical: Not on file     Non-medical: Not on file   Tobacco Use    Smoking status: Never Smoker    Smokeless tobacco: Never Used   Substance and Sexual Activity    Alcohol use: No    Drug use: No    Sexual activity: Not on file   Lifestyle    Physical activity:     Days per week: Not on file     Minutes per session: Not on file    Stress: Not on file   Relationships    Social connections:     Talks on phone: Not on file     Gets together: Not on file     Attends Christianity service: Not on file     Active member of club or organization: Not on file     Attends meetings of clubs or organizations: Not on file     Relationship status: Not on file   Other Topics Concern    Not on file   Social History Narrative    Not on file       History of present illness: Patient comes in today for her lumbar spine. She fell off a ladder. She injured her back. She's having a lot of back pain.      Review of Systems:    Constitution: Negative for chills, fever, and sweats.  Negative for unexplained weight loss.    HENT:  Negative for headaches and blurry vision.    Cardiovascular:Negative for chest pain or irregular heart beat. Negative for  hypertension.    Respiratory:  Negative for cough and shortness of breath.    Gastrointestinal: Negative for abdominal pain, heartburn, melena, nausea, and vomitting.    Genitourinary:  Negative bladder incontinence and dysuria.    Musculoskeletal:  See HPI for details.     Neurological: Negative for numbness.    Psychiatric/Behavioral: Negative for depression.  The patient is not nervous/anxious.      Endocrine: Negative for polyuria    Hematologic/Lymphatic: Negative for bleeding problem.  Does not bruise/bleed easily.    Skin: Negative for poor would healing and rash    Objective:      Physical Examination:    Vital Signs:    Vitals:    05/30/19 1425   BP: 98/60   Pulse: 81       Body mass index is 18.44 kg/m².    This a well-developed, well nourished patient in no acute distress.  They are alert and oriented and cooperative to examination.        Patient is very tender along the lumbar spine. Sensation is intact in the lower extremities. She can heel walk and toe walk. EHLs are intact deep tendon reflexes are intact.  Pertinent New Results:    XRAY Report / Interpretation:   AP of the pelvis is within normal limits.  AP and lateral of the thoracic spine is within normal limits.  AP and lateral the lumbar spine demonstrates a L1 compression fracture.  AP and lateral the ribs demonstrate no obvious displaced rib fractures  Assessment/Plan:      L1 compression fracture. I've ordered a CT scan to better  study the fracture. Additionally rib cage will be reviewed. I will see her back with that data  This note was created using Dragon voice recognition software that occasionally misinterpreted phrases or words.

## 2019-07-25 ENCOUNTER — OFFICE VISIT (OUTPATIENT)
Dept: FAMILY MEDICINE | Facility: CLINIC | Age: 69
End: 2019-07-25
Payer: COMMERCIAL

## 2019-07-25 VITALS
TEMPERATURE: 98 F | DIASTOLIC BLOOD PRESSURE: 66 MMHG | SYSTOLIC BLOOD PRESSURE: 120 MMHG | WEIGHT: 136 LBS | HEART RATE: 80 BPM | OXYGEN SATURATION: 98 % | RESPIRATION RATE: 14 BRPM | HEIGHT: 72 IN | BODY MASS INDEX: 18.42 KG/M2

## 2019-07-25 DIAGNOSIS — R53.82 CHRONIC FATIGUE: ICD-10-CM

## 2019-07-25 DIAGNOSIS — Z00.00 GENERAL MEDICAL EXAM: ICD-10-CM

## 2019-07-25 DIAGNOSIS — F90.1 ATTENTION DEFICIT HYPERACTIVITY DISORDER (ADHD), PREDOMINANTLY HYPERACTIVE TYPE: Primary | ICD-10-CM

## 2019-07-25 DIAGNOSIS — E78.2 MIXED HYPERLIPIDEMIA: ICD-10-CM

## 2019-07-25 DIAGNOSIS — M89.9 BONE DISEASE: ICD-10-CM

## 2019-07-25 DIAGNOSIS — G47.411 PRIMARY NARCOLEPSY WITH CATAPLEXY: ICD-10-CM

## 2019-07-25 DIAGNOSIS — Z12.39 BREAST SCREENING: ICD-10-CM

## 2019-07-25 PROCEDURE — 1101F PT FALLS ASSESS-DOCD LE1/YR: CPT | Mod: ,,, | Performed by: INTERNAL MEDICINE

## 2019-07-25 PROCEDURE — 1101F PR PT FALLS ASSESS DOC 0-1 FALLS W/OUT INJ PAST YR: ICD-10-PCS | Mod: ,,, | Performed by: INTERNAL MEDICINE

## 2019-07-25 PROCEDURE — 99214 OFFICE O/P EST MOD 30 MIN: CPT | Mod: ,,, | Performed by: INTERNAL MEDICINE

## 2019-07-25 PROCEDURE — 99214 PR OFFICE/OUTPT VISIT, EST, LEVL IV, 30-39 MIN: ICD-10-PCS | Mod: ,,, | Performed by: INTERNAL MEDICINE

## 2019-07-25 RX ORDER — MEPERIDINE HYDROCHLORIDE 50 MG/1
50 TABLET ORAL DAILY
Refills: 0 | COMMUNITY
Start: 2019-06-27 | End: 2019-07-25

## 2019-07-25 RX ORDER — SODIUM PICOSULFATE, MAGNESIUM OXIDE, AND ANHYDROUS CITRIC ACID 10; 3.5; 12 MG/160ML; G/160ML; G/160ML
LIQUID ORAL
Refills: 0 | COMMUNITY
Start: 2019-05-31 | End: 2019-07-25

## 2019-07-25 RX ORDER — MELOXICAM 15 MG/1
TABLET ORAL
Refills: 4 | COMMUNITY
Start: 2019-07-17 | End: 2019-07-25

## 2019-07-25 RX ORDER — DIAZEPAM 5 MG/1
TABLET ORAL
COMMUNITY
Start: 2019-07-19 | End: 2019-07-25

## 2019-07-25 RX ORDER — DEXTROAMPHETAMINE SACCHARATE, AMPHETAMINE ASPARTATE, DEXTROAMPHETAMINE SULFATE AND AMPHETAMINE SULFATE 5; 5; 5; 5 MG/1; MG/1; MG/1; MG/1
TABLET ORAL
Status: CANCELLED | OUTPATIENT
Start: 2019-07-25 | End: 2019-08-24

## 2019-07-25 NOTE — PROGRESS NOTES
SUBJECTIVE:    Patient ID: Norris Frank is a 69 y.o. female.    Chief Complaint: Medication Refill (medication changes) and Follow-up    HPI     Pt in for recheck-I have not seen this patient in some time, she is being followed by Hematology-no definitive diagnosis-did not have a good ending with her hematologist-she did some research and found she probably had adrenal insufficiency - gave her injection and started cortef-after 6 weeks she had no more nausea-she gradually tapered the cortef down to 5 mg every other day and weight is stable.    Patient is also very concerned re her fatigue and narcolepsy for which she has been high doses of adderall--    Since last visit she has had significant trauma-she fell and fractured 4- T spine vertebrae and 1 Lumbar vertebrae-she brings records from Dr Butts re her proceedures    She can't- stand for longer than 45 min at a time since her surgery-she has seen pain management and is to have another MRI next week-she describes pain in the mid back -she will have to resort to shallow breathing due to pain-pain is an 8-she has seen Dr Tam one time and told him she did not want to take any pain rx-prior pain rx did nothing-she is convinced there is no pain medicine that will help this level of pain.Pain management thinks she has facet syndrome-but wants to rule out another fractured vertebrae    Diccussed the need to consult a specialist to evaluate and refill her meds as I was not able to continue to refill meds as prior-I am not for certain that her dose of  adderal is not contributing to her heme picture..    No visits with results within 6 Month(s) from this visit.   Latest known visit with results is:   Admission on 08/21/2018, Discharged on 08/21/2018   Component Date Value Ref Range Status    aPTT 08/21/2018 32.1  24.6 - 36.7 sec Final    PT 08/21/2018 12.9  11.8 - 14.7 sec Final    INR 08/21/2018 1.0   Final    WBC 08/21/2018 3.70* 3.90 - 12.70 K/uL  Final    RBC 2018 3.83* 4.00 - 5.40 M/uL Final    Hemoglobin 2018 12.5  12.0 - 16.0 g/dL Final    Hematocrit 2018 36.4* 37.0 - 48.5 % Final    Mean Corpuscular Volume 2018 95  82 - 98 fL Final    Mean Corpuscular Hemoglobin 2018 32.6* 27.0 - 31.0 pg Final    Mean Corpuscular Hemoglobin Conc 2018 34.3  32.0 - 36.0 g/dL Final    RDW 2018 13.5  11.5 - 14.5 % Final    Platelets 2018 126* 150 - 350 K/uL Final    MPV 2018 11.4  9.2 - 12.9 fL Final    Gran # (ANC) 2018 1.6* 1.8 - 7.7 K/uL Final    Lymph # 2018 1.3  1.0 - 4.8 K/uL Final    Mono # 2018 0.4  0.3 - 1.0 K/uL Final    Eos # 2018 0.4  0.0 - 0.5 K/uL Final    Baso # 2018 0.04  0.00 - 0.20 K/uL Final    nRBC 2018 0  0 /100 WBC Final    Gran% 2018 42.1  38.0 - 73.0 % Final    Lymph% 2018 35.7  18.0 - 48.0 % Final    Mono% 2018 10.8  4.0 - 15.0 % Final    Eosinophil% 2018 10.3* 0.0 - 8.0 % Final    Basophil% 2018 1.1  0.0 - 1.9 % Final    Differential Method 2018 Automated   Final    Chromosome Analysis, Bone Marrow 2018 See result image under hyperlink   Final       Past Medical History:   Diagnosis Date    ADHD (attention deficit hyperactivity disorder)     Hypothyroidism      Past Surgical History:   Procedure Laterality Date    Biopsy-bone marrow N/A 2018    Performed by [facility] at [facility]     SECTION      CHOLECYSTECTOMY      FACIAL RECONSTRUCTION SURGERY       Family History   Problem Relation Age of Onset    Hypertension Mother     Cancer Father     Lung cancer Maternal Aunt        Marital Status:   Alcohol History:  reports that she does not drink alcohol.  Tobacco History:  reports that she has never smoked. She has never used smokeless tobacco.  Drug History:  reports that she does not use drugs.    Review of patient's allergies indicates:   Allergen Reactions     Livalo [pitavastatin calcium]     Percocet [oxycodone-acetaminophen]        Current Outpatient Medications:     ALPRAZolam (XANAX) 1 MG tablet, TK 1 T PO TID PRN, Disp: , Rfl: 5    calcium carbonate (OS-SUSSY) 600 mg calcium (1,500 mg) Tab, Take 600 mg by mouth once., Disp: , Rfl:     cevimeline (EVOXAC) 30 mg capsule, , Disp: , Rfl: 0    dextroamphetamine-amphetamine (ADDERALL) 20 mg tablet, Take 2 tablets by mouth 2 (two) times daily. (Patient taking differently: Take by mouth. 2 tablets in am and 2.5 tablets in afternoon), Disp: 120 tablet, Rfl: 0    Lactobacillus rhamnosus GG (CULTURELLE) 10 billion cell capsule, Take 1 capsule by mouth once daily., Disp: , Rfl:     multivitamin (ONE DAILY MULTIVITAMIN) per tablet, Take 1 tablet by mouth once daily., Disp: , Rfl:     sumatriptan (IMITREX STATDOSE) 6 mg/0.5 mL kit, , Disp: , Rfl: 0    testosterone 50 mg pellet, Inject 175 mg into the muscle., Disp: , Rfl:     thioridazine (MELLARIL) 25 MG tablet, TK 4 TS PO HS, Disp: , Rfl: 4    topiramate (TOPAMAX) 100 MG tablet, Take 50 mg by mouth every evening. , Disp: , Rfl: 0    zolpidem (AMBIEN) 10 mg Tab, TK 1 TO 2 TS PO QHS PRN, Disp: , Rfl: 3    Review of Systems   Constitutional: Negative for appetite change (weight change after cortef with taper to present dose), chills, diaphoresis, fatigue, fever and unexpected weight change.        Appears in pain and moves very cautiously   HENT: Negative for congestion, ear pain, hearing loss, nosebleeds, postnasal drip, sinus pressure, sinus pain, sneezing, sore throat, tinnitus, trouble swallowing and voice change.    Eyes: Negative for photophobia, pain, itching and visual disturbance.   Respiratory: Negative for apnea, cough, chest tightness, shortness of breath (problems with SOB due to T spine trauma), wheezing and stridor.    Cardiovascular: Negative for chest pain, palpitations and leg swelling.        Runs low BP normally   Gastrointestinal: Negative for  abdominal distention, abdominal pain, blood in stool, constipation, diarrhea, nausea (extreme nausea and fatigu during the period of time she was adrenal insufficient) and vomiting.   Endocrine: Negative for cold intolerance, heat intolerance, polydipsia and polyuria.        Sweats at night   Genitourinary: Negative for difficulty urinating, dyspareunia, dysuria, flank pain, frequency, hematuria, menstrual problem, pelvic pain, urgency, vaginal discharge and vaginal pain.   Musculoskeletal: Negative for arthralgias, back pain (HPI), joint swelling, myalgias, neck pain and neck stiffness.   Skin: Negative for pallor.   Allergic/Immunologic: Negative for environmental allergies and food allergies.   Neurological: Negative for dizziness, tremors, speech difficulty, weakness (HPInow resolved), light-headedness and numbness.        Mind never shuts down --also with auditory processing problems-decreased reading comprehension--ADD-ADHD   Hematological: Does not bruise/bleed easily.   Psychiatric/Behavioral: Negative for agitation, confusion, decreased concentration, sleep disturbance and suicidal ideas. The patient is not nervous/anxious.         Tactile sensitivity-see HPI and neurological-ADD-ADHD-          Objective:      Vitals:    07/25/19 1130   BP: 120/66   Pulse: 80   Resp: 14   Temp: 98 °F (36.7 °C)   SpO2: 98%   Weight: 61.7 kg (136 lb)   Height: 6' (1.829 m)     Physical Exam   Constitutional: She is oriented to person, place, and time. She appears well-developed and well-nourished. She is cooperative. No distress.   evidence of weight gain since last office visit -   HENT:   Head: Normocephalic and atraumatic.   Right Ear: Tympanic membrane normal.   Left Ear: Tympanic membrane normal.   Nose: Nose normal.   Mouth/Throat: Uvula is midline and mucous membranes are normal.   Eyes: Pupils are equal, round, and reactive to light. Conjunctivae and EOM are normal. Right eye exhibits no discharge. Left eye exhibits  no discharge. No scleral icterus. Right pupil is round and reactive. Left pupil is round and reactive.   Neck: Trachea normal and normal range of motion. Neck supple. No JVD present. Carotid bruit is not present. No thyromegaly present.   Cardiovascular: Normal rate and regular rhythm. Exam reveals no gallop and no friction rub.   No murmur heard.  Pulmonary/Chest: Effort normal and breath sounds normal. No respiratory distress. She has no wheezes. She has no rales.   Abdominal: Soft. Bowel sounds are normal. She exhibits no distension and no mass. There is no tenderness. There is no guarding. No hernia.   Musculoskeletal: Normal range of motion. She exhibits no edema.   Neurological: She is alert and oriented to person, place, and time. She has normal strength.   Skin: Skin is warm and dry. Capillary refill takes less than 2 seconds. No lesion and no rash noted. No cyanosis. Nails show no clubbing.   Psychiatric: Her speech is normal and behavior is normal. Judgment and thought content normal.   More subdued today but periods of hypomania, with escalated voice and increased verbage   Nursing note and vitals reviewed.        Assessment:       1. Attention deficit hyperactivity disorder (ADHD), predominantly hyperactive type    2. Mixed hyperlipidemia    3. Primary narcolepsy with cataplexy    4. Chronic fatigue    5. General medical exam    6. Breast screening    7. Bone disease         Plan:       Attention deficit hyperactivity disorder (ADHD), predominantly hyperactive type  -     Ambulatory referral to Psychiatry    Mixed hyperlipidemia  -     Lipid panel; Future; Expected date: 07/25/2019    Primary narcolepsy with cataplexy  -     Ambulatory referral to Psychiatry    Chronic fatigue    General medical exam  -     CBC auto differential; Future; Expected date: 07/25/2019  -     Comprehensive metabolic panel; Future; Expected date: 07/25/2019  -     Urinalysis; Future; Expected date: 07/25/2019    Breast  screening  -     Mammo Digital Screening Bilat without CA    Bone disease  -     DXA Bone Density Spine And Hip      No follow-ups on file.        7/27/2019 Brie Dhillon M.D.

## 2019-07-31 ENCOUNTER — TELEPHONE (OUTPATIENT)
Dept: FAMILY MEDICINE | Facility: CLINIC | Age: 69
End: 2019-07-31

## 2019-07-31 NOTE — TELEPHONE ENCOUNTER
Spoke with Claribel at Dr Treviño's office. Earliest available appt (and pt is aware) is on Sept 4th @ 1pm.

## 2019-10-11 ENCOUNTER — TELEPHONE (OUTPATIENT)
Dept: PSYCHIATRY | Facility: CLINIC | Age: 69
End: 2019-10-11

## 2019-10-11 NOTE — TELEPHONE ENCOUNTER
Called pt regarding below message. Advised pt that she was originally scheduled with Dr. Treviño but missed her appt. Advised pt we can reschedule her however next available is December 17th. Pt agreed to schedule and request to be placed on wait list. Advised pt to call with any concerns. Pt verbalized understanding with no further questions.     ----- Message from Linda Rodriguez MA sent at 10/11/2019  1:48 PM CDT -----  Contact: pt      ----- Message -----  From: Arlet Harman  Sent: 10/11/2019  12:15 PM CDT  To: Pineda SHEARER Staff    Type: Needs Medical Advice    Who Called:  pt  Best Call Back Number:   Additional Information: Pt is requesting a call from Nurse, regarding an appt access, please call with advice

## 2019-10-24 ENCOUNTER — TELEPHONE (OUTPATIENT)
Dept: FAMILY MEDICINE | Facility: CLINIC | Age: 69
End: 2019-10-24

## 2019-10-27 ENCOUNTER — PATIENT MESSAGE (OUTPATIENT)
Dept: FAMILY MEDICINE | Facility: CLINIC | Age: 69
End: 2019-10-27

## 2021-01-20 DIAGNOSIS — M47.817 LUMBOSACRAL SPONDYLOSIS WITHOUT MYELOPATHY: Primary | ICD-10-CM

## 2021-01-25 ENCOUNTER — HOSPITAL ENCOUNTER (OUTPATIENT)
Dept: RADIOLOGY | Facility: HOSPITAL | Age: 71
Discharge: HOME | End: 2021-01-25
Attending: PAIN MEDICINE
Payer: COMMERCIAL

## 2021-01-25 DIAGNOSIS — M47.817 LUMBOSACRAL SPONDYLOSIS WITHOUT MYELOPATHY: ICD-10-CM

## 2021-01-25 PROCEDURE — 72148 MRI LUMBAR SPINE W/O DYE: CPT | Mod: TC,PO

## 2021-04-29 ENCOUNTER — PATIENT MESSAGE (OUTPATIENT)
Dept: RESEARCH | Facility: HOSPITAL | Age: 71
End: 2021-04-29

## 2021-05-20 ENCOUNTER — OFFICE VISIT (OUTPATIENT)
Dept: ORTHOPEDICS | Facility: CLINIC | Age: 71
End: 2021-05-20
Payer: COMMERCIAL

## 2021-05-20 VITALS — WEIGHT: 136 LBS | HEIGHT: 72 IN | BODY MASS INDEX: 18.42 KG/M2

## 2021-05-20 DIAGNOSIS — M25.512 ACUTE PAIN OF LEFT SHOULDER: ICD-10-CM

## 2021-05-20 DIAGNOSIS — M75.42 IMPINGEMENT SYNDROME OF LEFT SHOULDER: Primary | ICD-10-CM

## 2021-05-20 PROCEDURE — 1159F PR MEDICATION LIST DOCUMENTED IN MEDICAL RECORD: ICD-10-PCS | Mod: S$GLB,,, | Performed by: ORTHOPAEDIC SURGERY

## 2021-05-20 PROCEDURE — 1101F PT FALLS ASSESS-DOCD LE1/YR: CPT | Mod: S$GLB,,, | Performed by: ORTHOPAEDIC SURGERY

## 2021-05-20 PROCEDURE — 99213 PR OFFICE/OUTPT VISIT, EST, LEVL III, 20-29 MIN: ICD-10-PCS | Mod: 25,S$GLB,, | Performed by: ORTHOPAEDIC SURGERY

## 2021-05-20 PROCEDURE — 3288F PR FALLS RISK ASSESSMENT DOCUMENTED: ICD-10-PCS | Mod: S$GLB,,, | Performed by: ORTHOPAEDIC SURGERY

## 2021-05-20 PROCEDURE — 1159F MED LIST DOCD IN RCRD: CPT | Mod: S$GLB,,, | Performed by: ORTHOPAEDIC SURGERY

## 2021-05-20 PROCEDURE — 1125F PR PAIN SEVERITY QUANTIFIED, PAIN PRESENT: ICD-10-PCS | Mod: S$GLB,,, | Performed by: ORTHOPAEDIC SURGERY

## 2021-05-20 PROCEDURE — 20610 LARGE JOINT ASPIRATION/INJECTION: L SUBACROMIAL BURSA: ICD-10-PCS | Mod: LT,S$GLB,, | Performed by: ORTHOPAEDIC SURGERY

## 2021-05-20 PROCEDURE — 99213 OFFICE O/P EST LOW 20 MIN: CPT | Mod: 25,S$GLB,, | Performed by: ORTHOPAEDIC SURGERY

## 2021-05-20 PROCEDURE — 20610 DRAIN/INJ JOINT/BURSA W/O US: CPT | Mod: LT,S$GLB,, | Performed by: ORTHOPAEDIC SURGERY

## 2021-05-20 PROCEDURE — 3288F FALL RISK ASSESSMENT DOCD: CPT | Mod: S$GLB,,, | Performed by: ORTHOPAEDIC SURGERY

## 2021-05-20 PROCEDURE — 1101F PR PT FALLS ASSESS DOC 0-1 FALLS W/OUT INJ PAST YR: ICD-10-PCS | Mod: S$GLB,,, | Performed by: ORTHOPAEDIC SURGERY

## 2021-05-20 PROCEDURE — 3008F BODY MASS INDEX DOCD: CPT | Mod: S$GLB,,, | Performed by: ORTHOPAEDIC SURGERY

## 2021-05-20 PROCEDURE — 1125F AMNT PAIN NOTED PAIN PRSNT: CPT | Mod: S$GLB,,, | Performed by: ORTHOPAEDIC SURGERY

## 2021-05-20 PROCEDURE — 3008F PR BODY MASS INDEX (BMI) DOCUMENTED: ICD-10-PCS | Mod: S$GLB,,, | Performed by: ORTHOPAEDIC SURGERY

## 2021-05-20 RX ORDER — METHYLPREDNISOLONE ACETATE 40 MG/ML
40 INJECTION, SUSPENSION INTRA-ARTICULAR; INTRALESIONAL; INTRAMUSCULAR; SOFT TISSUE
Status: DISCONTINUED | OUTPATIENT
Start: 2021-05-20 | End: 2021-05-20 | Stop reason: HOSPADM

## 2021-05-20 RX ADMIN — METHYLPREDNISOLONE ACETATE 40 MG: 40 INJECTION, SUSPENSION INTRA-ARTICULAR; INTRALESIONAL; INTRAMUSCULAR; SOFT TISSUE at 07:05

## 2021-06-03 ENCOUNTER — OFFICE VISIT (OUTPATIENT)
Dept: ORTHOPEDICS | Facility: CLINIC | Age: 71
End: 2021-06-03
Payer: COMMERCIAL

## 2021-06-03 VITALS
DIASTOLIC BLOOD PRESSURE: 64 MMHG | SYSTOLIC BLOOD PRESSURE: 102 MMHG | WEIGHT: 134 LBS | HEART RATE: 75 BPM | BODY MASS INDEX: 18.15 KG/M2 | HEIGHT: 72 IN

## 2021-06-03 DIAGNOSIS — M75.42 IMPINGEMENT SYNDROME OF LEFT SHOULDER: Primary | ICD-10-CM

## 2021-06-03 DIAGNOSIS — M19.012 ARTHRITIS OF LEFT ACROMIOCLAVICULAR JOINT: ICD-10-CM

## 2021-06-03 PROCEDURE — 3008F PR BODY MASS INDEX (BMI) DOCUMENTED: ICD-10-PCS | Mod: S$GLB,,, | Performed by: ORTHOPAEDIC SURGERY

## 2021-06-03 PROCEDURE — 3288F PR FALLS RISK ASSESSMENT DOCUMENTED: ICD-10-PCS | Mod: S$GLB,,, | Performed by: ORTHOPAEDIC SURGERY

## 2021-06-03 PROCEDURE — 99213 OFFICE O/P EST LOW 20 MIN: CPT | Mod: S$GLB,,, | Performed by: ORTHOPAEDIC SURGERY

## 2021-06-03 PROCEDURE — 3288F FALL RISK ASSESSMENT DOCD: CPT | Mod: S$GLB,,, | Performed by: ORTHOPAEDIC SURGERY

## 2021-06-03 PROCEDURE — 1125F AMNT PAIN NOTED PAIN PRSNT: CPT | Mod: S$GLB,,, | Performed by: ORTHOPAEDIC SURGERY

## 2021-06-03 PROCEDURE — 99213 PR OFFICE/OUTPT VISIT, EST, LEVL III, 20-29 MIN: ICD-10-PCS | Mod: S$GLB,,, | Performed by: ORTHOPAEDIC SURGERY

## 2021-06-03 PROCEDURE — 3008F BODY MASS INDEX DOCD: CPT | Mod: S$GLB,,, | Performed by: ORTHOPAEDIC SURGERY

## 2021-06-03 PROCEDURE — 1125F PR PAIN SEVERITY QUANTIFIED, PAIN PRESENT: ICD-10-PCS | Mod: S$GLB,,, | Performed by: ORTHOPAEDIC SURGERY

## 2021-06-03 PROCEDURE — 1101F PT FALLS ASSESS-DOCD LE1/YR: CPT | Mod: S$GLB,,, | Performed by: ORTHOPAEDIC SURGERY

## 2021-06-03 PROCEDURE — 1101F PR PT FALLS ASSESS DOC 0-1 FALLS W/OUT INJ PAST YR: ICD-10-PCS | Mod: S$GLB,,, | Performed by: ORTHOPAEDIC SURGERY

## 2021-06-03 PROCEDURE — 1159F MED LIST DOCD IN RCRD: CPT | Mod: S$GLB,,, | Performed by: ORTHOPAEDIC SURGERY

## 2021-06-03 PROCEDURE — 1159F PR MEDICATION LIST DOCUMENTED IN MEDICAL RECORD: ICD-10-PCS | Mod: S$GLB,,, | Performed by: ORTHOPAEDIC SURGERY

## 2021-06-03 RX ORDER — BACLOFEN 20 MG/1
20 TABLET ORAL 3 TIMES DAILY PRN
Status: ON HOLD | COMMUNITY
Start: 2021-05-05 | End: 2023-04-07

## 2021-06-03 RX ORDER — BUMETANIDE 0.5 MG/1
0.5 TABLET ORAL DAILY PRN
Status: ON HOLD | COMMUNITY
Start: 2021-05-05 | End: 2023-04-07

## 2023-02-10 ENCOUNTER — LAB VISIT (OUTPATIENT)
Dept: LAB | Facility: HOSPITAL | Age: 73
End: 2023-02-10
Attending: NURSE PRACTITIONER
Payer: COMMERCIAL

## 2023-02-10 DIAGNOSIS — E55.9 AVITAMINOSIS D: ICD-10-CM

## 2023-02-10 DIAGNOSIS — I95.9 HYPOTENSION, UNSPECIFIED: ICD-10-CM

## 2023-02-10 DIAGNOSIS — E03.9 MYXEDEMA HEART DISEASE: Primary | ICD-10-CM

## 2023-02-10 DIAGNOSIS — I51.9 MYXEDEMA HEART DISEASE: Primary | ICD-10-CM

## 2023-02-10 LAB
25(OH)D3+25(OH)D2 SERPL-MCNC: 43 NG/ML (ref 30–96)
ALBUMIN SERPL BCP-MCNC: 4.1 G/DL (ref 3.5–5.2)
ALP SERPL-CCNC: 59 U/L (ref 55–135)
ALT SERPL W/O P-5'-P-CCNC: 18 U/L (ref 10–44)
ANION GAP SERPL CALC-SCNC: 9 MMOL/L (ref 8–16)
AST SERPL-CCNC: 23 U/L (ref 10–40)
BASOPHILS # BLD AUTO: 0.03 K/UL (ref 0–0.2)
BASOPHILS NFR BLD: 0.6 % (ref 0–1.9)
BILIRUB SERPL-MCNC: 0.5 MG/DL (ref 0.1–1)
BUN SERPL-MCNC: 15 MG/DL (ref 8–23)
CALCIUM SERPL-MCNC: 9.1 MG/DL (ref 8.7–10.5)
CHLORIDE SERPL-SCNC: 102 MMOL/L (ref 95–110)
CHOLEST SERPL-MCNC: 285 MG/DL (ref 120–199)
CHOLEST/HDLC SERPL: 3.5 {RATIO} (ref 2–5)
CO2 SERPL-SCNC: 27 MMOL/L (ref 23–29)
CREAT SERPL-MCNC: 0.9 MG/DL (ref 0.5–1.4)
DIFFERENTIAL METHOD: ABNORMAL
EOSINOPHIL # BLD AUTO: 0 K/UL (ref 0–0.5)
EOSINOPHIL NFR BLD: 0.8 % (ref 0–8)
ERYTHROCYTE [DISTWIDTH] IN BLOOD BY AUTOMATED COUNT: 14.6 % (ref 11.5–14.5)
EST. GFR  (NO RACE VARIABLE): >60 ML/MIN/1.73 M^2
GLUCOSE SERPL-MCNC: 97 MG/DL (ref 70–110)
HCT VFR BLD AUTO: 40.6 % (ref 37–48.5)
HDLC SERPL-MCNC: 81 MG/DL (ref 40–75)
HDLC SERPL: 28.4 % (ref 20–50)
HGB BLD-MCNC: 13.5 G/DL (ref 12–16)
IMM GRANULOCYTES # BLD AUTO: 0.04 K/UL (ref 0–0.04)
IMM GRANULOCYTES NFR BLD AUTO: 0.8 % (ref 0–0.5)
LDLC SERPL CALC-MCNC: 190.8 MG/DL (ref 63–159)
LYMPHOCYTES # BLD AUTO: 1.1 K/UL (ref 1–4.8)
LYMPHOCYTES NFR BLD: 23.4 % (ref 18–48)
MAGNESIUM SERPL-MCNC: 1.8 MG/DL (ref 1.6–2.6)
MCH RBC QN AUTO: 30.6 PG (ref 27–31)
MCHC RBC AUTO-ENTMCNC: 33.3 G/DL (ref 32–36)
MCV RBC AUTO: 92 FL (ref 82–98)
MONOCYTES # BLD AUTO: 0.4 K/UL (ref 0.3–1)
MONOCYTES NFR BLD: 9.2 % (ref 4–15)
NEUTROPHILS # BLD AUTO: 3.1 K/UL (ref 1.8–7.7)
NEUTROPHILS NFR BLD: 65.2 % (ref 38–73)
NONHDLC SERPL-MCNC: 204 MG/DL
NRBC BLD-RTO: 0 /100 WBC
PLATELET # BLD AUTO: 187 K/UL (ref 150–450)
PMV BLD AUTO: 10.4 FL (ref 9.2–12.9)
POTASSIUM SERPL-SCNC: 4 MMOL/L (ref 3.5–5.1)
PROT SERPL-MCNC: 6.9 G/DL (ref 6–8.4)
RBC # BLD AUTO: 4.41 M/UL (ref 4–5.4)
SODIUM SERPL-SCNC: 138 MMOL/L (ref 136–145)
T4 FREE SERPL-MCNC: 0.75 NG/DL (ref 0.71–1.51)
TRIGL SERPL-MCNC: 66 MG/DL (ref 30–150)
TSH SERPL DL<=0.005 MIU/L-ACNC: 1.5 UIU/ML (ref 0.34–5.6)
WBC # BLD AUTO: 4.79 K/UL (ref 3.9–12.7)

## 2023-02-10 PROCEDURE — 80053 COMPREHEN METABOLIC PANEL: CPT | Performed by: NURSE PRACTITIONER

## 2023-02-10 PROCEDURE — 82306 VITAMIN D 25 HYDROXY: CPT | Performed by: NURSE PRACTITIONER

## 2023-02-10 PROCEDURE — 36415 COLL VENOUS BLD VENIPUNCTURE: CPT | Performed by: NURSE PRACTITIONER

## 2023-02-10 PROCEDURE — 84443 ASSAY THYROID STIM HORMONE: CPT | Performed by: NURSE PRACTITIONER

## 2023-02-10 PROCEDURE — 80061 LIPID PANEL: CPT | Performed by: NURSE PRACTITIONER

## 2023-02-10 PROCEDURE — 83735 ASSAY OF MAGNESIUM: CPT | Performed by: NURSE PRACTITIONER

## 2023-02-10 PROCEDURE — 84439 ASSAY OF FREE THYROXINE: CPT | Performed by: NURSE PRACTITIONER

## 2023-02-10 PROCEDURE — 85025 COMPLETE CBC W/AUTO DIFF WBC: CPT | Performed by: NURSE PRACTITIONER

## 2023-04-07 ENCOUNTER — HOSPITAL ENCOUNTER (INPATIENT)
Facility: HOSPITAL | Age: 73
LOS: 3 days | Discharge: HOME HEALTH | DRG: 481 | End: 2023-04-10
Attending: EMERGENCY MEDICINE | Admitting: STUDENT IN AN ORGANIZED HEALTH CARE EDUCATION/TRAINING PROGRAM
Payer: COMMERCIAL

## 2023-04-07 DIAGNOSIS — R07.9 CHEST PAIN: ICD-10-CM

## 2023-04-07 DIAGNOSIS — S72.001A CLOSED RIGHT HIP FRACTURE, INITIAL ENCOUNTER: ICD-10-CM

## 2023-04-07 DIAGNOSIS — Z01.818 PRE-OP TESTING: ICD-10-CM

## 2023-04-07 DIAGNOSIS — S72.141A DISPLACED INTERTROCHANTERIC FRACTURE OF RIGHT FEMUR, INITIAL ENCOUNTER FOR CLOSED FRACTURE: Primary | ICD-10-CM

## 2023-04-07 PROBLEM — F98.8 ATTENTION DEFICIT DISORDER (ADD) WITHOUT HYPERACTIVITY: Chronic | Status: ACTIVE | Noted: 2023-04-07

## 2023-04-07 PROBLEM — G89.29 CHRONIC NECK AND BACK PAIN: Chronic | Status: ACTIVE | Noted: 2023-04-07

## 2023-04-07 PROBLEM — M54.9 CHRONIC NECK AND BACK PAIN: Chronic | Status: ACTIVE | Noted: 2023-04-07

## 2023-04-07 PROBLEM — F41.9 ANXIETY: Chronic | Status: ACTIVE | Noted: 2023-04-07

## 2023-04-07 PROBLEM — E27.40 ADRENAL INSUFFICIENCY: Chronic | Status: ACTIVE | Noted: 2023-04-07

## 2023-04-07 PROBLEM — M54.2 CHRONIC NECK AND BACK PAIN: Chronic | Status: ACTIVE | Noted: 2023-04-07

## 2023-04-07 LAB
ANION GAP SERPL CALC-SCNC: 10 MMOL/L (ref 8–16)
BASOPHILS # BLD AUTO: 0.05 K/UL (ref 0–0.2)
BASOPHILS NFR BLD: 0.5 % (ref 0–1.9)
BUN SERPL-MCNC: 9 MG/DL (ref 8–23)
CALCIUM SERPL-MCNC: 8.9 MG/DL (ref 8.7–10.5)
CHLORIDE SERPL-SCNC: 105 MMOL/L (ref 95–110)
CO2 SERPL-SCNC: 25 MMOL/L (ref 23–29)
CREAT SERPL-MCNC: 0.8 MG/DL (ref 0.5–1.4)
DIFFERENTIAL METHOD: ABNORMAL
EOSINOPHIL # BLD AUTO: 0.1 K/UL (ref 0–0.5)
EOSINOPHIL NFR BLD: 0.7 % (ref 0–8)
ERYTHROCYTE [DISTWIDTH] IN BLOOD BY AUTOMATED COUNT: 13.8 % (ref 11.5–14.5)
EST. GFR  (NO RACE VARIABLE): >60 ML/MIN/1.73 M^2
GLUCOSE SERPL-MCNC: 104 MG/DL (ref 70–110)
HCT VFR BLD AUTO: 38.9 % (ref 37–48.5)
HGB BLD-MCNC: 13.3 G/DL (ref 12–16)
IMM GRANULOCYTES # BLD AUTO: 0.06 K/UL (ref 0–0.04)
IMM GRANULOCYTES NFR BLD AUTO: 0.6 % (ref 0–0.5)
LYMPHOCYTES # BLD AUTO: 0.8 K/UL (ref 1–4.8)
LYMPHOCYTES NFR BLD: 8.7 % (ref 18–48)
MCH RBC QN AUTO: 31.7 PG (ref 27–31)
MCHC RBC AUTO-ENTMCNC: 34.2 G/DL (ref 32–36)
MCV RBC AUTO: 93 FL (ref 82–98)
MONOCYTES # BLD AUTO: 0.4 K/UL (ref 0.3–1)
MONOCYTES NFR BLD: 4.5 % (ref 4–15)
NEUTROPHILS # BLD AUTO: 8.2 K/UL (ref 1.8–7.7)
NEUTROPHILS NFR BLD: 85 % (ref 38–73)
NRBC BLD-RTO: 0 /100 WBC
PLATELET # BLD AUTO: 169 K/UL (ref 150–450)
PMV BLD AUTO: 11.2 FL (ref 9.2–12.9)
POTASSIUM SERPL-SCNC: 3.7 MMOL/L (ref 3.5–5.1)
RBC # BLD AUTO: 4.2 M/UL (ref 4–5.4)
SODIUM SERPL-SCNC: 140 MMOL/L (ref 136–145)
WBC # BLD AUTO: 9.68 K/UL (ref 3.9–12.7)

## 2023-04-07 PROCEDURE — 36415 COLL VENOUS BLD VENIPUNCTURE: CPT | Performed by: EMERGENCY MEDICINE

## 2023-04-07 PROCEDURE — 93010 ELECTROCARDIOGRAM REPORT: CPT | Mod: ,,, | Performed by: INTERNAL MEDICINE

## 2023-04-07 PROCEDURE — 96374 THER/PROPH/DIAG INJ IV PUSH: CPT

## 2023-04-07 PROCEDURE — 93005 ELECTROCARDIOGRAM TRACING: CPT

## 2023-04-07 PROCEDURE — 63600175 PHARM REV CODE 636 W HCPCS: Performed by: STUDENT IN AN ORGANIZED HEALTH CARE EDUCATION/TRAINING PROGRAM

## 2023-04-07 PROCEDURE — 85025 COMPLETE CBC W/AUTO DIFF WBC: CPT | Performed by: EMERGENCY MEDICINE

## 2023-04-07 PROCEDURE — 99285 EMERGENCY DEPT VISIT HI MDM: CPT | Mod: 25

## 2023-04-07 PROCEDURE — 63600175 PHARM REV CODE 636 W HCPCS: Performed by: EMERGENCY MEDICINE

## 2023-04-07 PROCEDURE — 80048 BASIC METABOLIC PNL TOTAL CA: CPT | Performed by: EMERGENCY MEDICINE

## 2023-04-07 PROCEDURE — 12000002 HC ACUTE/MED SURGE SEMI-PRIVATE ROOM

## 2023-04-07 PROCEDURE — 93010 EKG 12-LEAD: ICD-10-PCS | Mod: ,,, | Performed by: INTERNAL MEDICINE

## 2023-04-07 PROCEDURE — 25000003 PHARM REV CODE 250: Performed by: STUDENT IN AN ORGANIZED HEALTH CARE EDUCATION/TRAINING PROGRAM

## 2023-04-07 RX ORDER — SODIUM,POTASSIUM PHOSPHATES 280-250MG
2 POWDER IN PACKET (EA) ORAL
Status: DISCONTINUED | OUTPATIENT
Start: 2023-04-07 | End: 2023-04-08

## 2023-04-07 RX ORDER — DEXTROSE 40 %
15 GEL (GRAM) ORAL
Status: DISCONTINUED | OUTPATIENT
Start: 2023-04-07 | End: 2023-04-08

## 2023-04-07 RX ORDER — MORPHINE SULFATE 2 MG/ML
6 INJECTION, SOLUTION INTRAMUSCULAR; INTRAVENOUS EVERY 4 HOURS PRN
Status: DISCONTINUED | OUTPATIENT
Start: 2023-04-07 | End: 2023-04-10 | Stop reason: HOSPADM

## 2023-04-07 RX ORDER — ACETAMINOPHEN 500 MG
1000 TABLET ORAL EVERY 6 HOURS PRN
Status: DISCONTINUED | OUTPATIENT
Start: 2023-04-07 | End: 2023-04-10 | Stop reason: HOSPADM

## 2023-04-07 RX ORDER — IBUPROFEN 200 MG
24 TABLET ORAL
Status: DISCONTINUED | OUTPATIENT
Start: 2023-04-07 | End: 2023-04-07 | Stop reason: SDUPTHER

## 2023-04-07 RX ORDER — MIDODRINE HYDROCHLORIDE 2.5 MG/1
2.5 TABLET ORAL DAILY PRN
COMMUNITY

## 2023-04-07 RX ORDER — MORPHINE SULFATE 2 MG/ML
6 INJECTION, SOLUTION INTRAMUSCULAR; INTRAVENOUS ONCE
Status: COMPLETED | OUTPATIENT
Start: 2023-04-07 | End: 2023-04-07

## 2023-04-07 RX ORDER — NALOXONE HCL 0.4 MG/ML
0.02 VIAL (ML) INJECTION
Status: DISCONTINUED | OUTPATIENT
Start: 2023-04-07 | End: 2023-04-10 | Stop reason: HOSPADM

## 2023-04-07 RX ORDER — ONDANSETRON 4 MG/1
8 TABLET, ORALLY DISINTEGRATING ORAL EVERY 8 HOURS PRN
Status: DISCONTINUED | OUTPATIENT
Start: 2023-04-07 | End: 2023-04-10 | Stop reason: HOSPADM

## 2023-04-07 RX ORDER — THIORIDAZINE HYDROCHLORIDE 25 MG/1
150 TABLET, FILM COATED ORAL NIGHTLY
Status: DISCONTINUED | OUTPATIENT
Start: 2023-04-07 | End: 2023-04-10 | Stop reason: HOSPADM

## 2023-04-07 RX ORDER — LANOLIN ALCOHOL/MO/W.PET/CERES
800 CREAM (GRAM) TOPICAL
Status: DISCONTINUED | OUTPATIENT
Start: 2023-04-07 | End: 2023-04-08

## 2023-04-07 RX ORDER — IBUPROFEN 200 MG
16 TABLET ORAL
Status: DISCONTINUED | OUTPATIENT
Start: 2023-04-07 | End: 2023-04-07 | Stop reason: SDUPTHER

## 2023-04-07 RX ORDER — CEVIMELINE HYDROCHLORIDE 30 MG/1
30 CAPSULE ORAL DAILY
Status: DISCONTINUED | OUTPATIENT
Start: 2023-04-08 | End: 2023-04-10 | Stop reason: HOSPADM

## 2023-04-07 RX ORDER — MORPHINE SULFATE 2 MG/ML
6 INJECTION, SOLUTION INTRAMUSCULAR; INTRAVENOUS
Status: COMPLETED | OUTPATIENT
Start: 2023-04-07 | End: 2023-04-07

## 2023-04-07 RX ORDER — TALC
6 POWDER (GRAM) TOPICAL NIGHTLY PRN
Status: DISCONTINUED | OUTPATIENT
Start: 2023-04-07 | End: 2023-04-08

## 2023-04-07 RX ORDER — ALPRAZOLAM 1 MG/1
2 TABLET ORAL 2 TIMES DAILY PRN
Status: DISCONTINUED | OUTPATIENT
Start: 2023-04-07 | End: 2023-04-10 | Stop reason: HOSPADM

## 2023-04-07 RX ORDER — HYDROCORTISONE 5 MG/1
5 TABLET ORAL DAILY
Status: DISCONTINUED | OUTPATIENT
Start: 2023-04-08 | End: 2023-04-10 | Stop reason: HOSPADM

## 2023-04-07 RX ORDER — GABAPENTIN 300 MG/1
600 CAPSULE ORAL NIGHTLY
Status: DISCONTINUED | OUTPATIENT
Start: 2023-04-07 | End: 2023-04-10 | Stop reason: HOSPADM

## 2023-04-07 RX ORDER — MAG HYDROX/ALUMINUM HYD/SIMETH 200-200-20
30 SUSPENSION, ORAL (FINAL DOSE FORM) ORAL 4 TIMES DAILY PRN
Status: DISCONTINUED | OUTPATIENT
Start: 2023-04-07 | End: 2023-04-08

## 2023-04-07 RX ORDER — DEXTROSE 40 %
30 GEL (GRAM) ORAL
Status: DISCONTINUED | OUTPATIENT
Start: 2023-04-07 | End: 2023-04-08

## 2023-04-07 RX ORDER — TRAMADOL HYDROCHLORIDE 50 MG/1
100 TABLET ORAL EVERY 4 HOURS PRN
Status: DISCONTINUED | OUTPATIENT
Start: 2023-04-07 | End: 2023-04-09

## 2023-04-07 RX ORDER — GLUCAGON 1 MG
1 KIT INJECTION
Status: DISCONTINUED | OUTPATIENT
Start: 2023-04-07 | End: 2023-04-08

## 2023-04-07 RX ORDER — SODIUM CHLORIDE 0.9 % (FLUSH) 0.9 %
10 SYRINGE (ML) INJECTION EVERY 12 HOURS PRN
Status: DISCONTINUED | OUTPATIENT
Start: 2023-04-07 | End: 2023-04-10 | Stop reason: HOSPADM

## 2023-04-07 RX ADMIN — ONDANSETRON 8 MG: 4 TABLET, ORALLY DISINTEGRATING ORAL at 11:04

## 2023-04-07 RX ADMIN — MORPHINE SULFATE 6 MG: 2 INJECTION, SOLUTION INTRAMUSCULAR; INTRAVENOUS at 05:04

## 2023-04-07 RX ADMIN — MORPHINE SULFATE 6 MG: 2 INJECTION, SOLUTION INTRAMUSCULAR; INTRAVENOUS at 03:04

## 2023-04-07 RX ADMIN — MORPHINE SULFATE 6 MG: 2 INJECTION, SOLUTION INTRAMUSCULAR; INTRAVENOUS at 10:04

## 2023-04-07 RX ADMIN — MORPHINE SULFATE 6 MG: 2 INJECTION, SOLUTION INTRAMUSCULAR; INTRAVENOUS at 01:04

## 2023-04-07 NOTE — HOSPITAL COURSE
Norris Frank is a 72 year old female with a past medical history of adrenal insufficiency, ADD, anxiety, and chronic neck and back pain who was admitted after a mechanical fall with acute displaced and impacted right intertrochanteric femoral fracture. Orthopedic Surgery was consulted and took the patient to the OR 4/8 for IMN placement without complication. PT/OT was consulted and recommended  PT/OT which was arranged 4/10/2023 upon discharge. She will follow up with her PCP and with Orthopedic Surgery in the outpatient setting.

## 2023-04-07 NOTE — NURSING
Pt AAO x 3, no distress noted. Vital sign WNL. Pt have right femur fracture. IV intact and dry. Updated with care, verbalized understanding.

## 2023-04-07 NOTE — HPI
Norris Frank is a 72 year old female with a past medical history of adrenal insufficiency, ADD, anxiety, and chronic neck and back pain who presents with her  for right hip pain after a fall earlier today while playing tennis. She had extreme pain and difficulty walking and generally moving the RLE after the fall. Pain is slightly improved now after pain meds in ER. Found to have an acute displaced and impacted right intertrochanteric femoral fracture on xray imaging. ER MD spoke to ortho MD who agrees to consult and likely surgery tomorrow 4/8. Admitted to hospital medicine service for continued management.

## 2023-04-07 NOTE — SUBJECTIVE & OBJECTIVE
Past Medical History:   Diagnosis Date    ADHD (attention deficit hyperactivity disorder)     Hypothyroidism        Past Surgical History:   Procedure Laterality Date    BONE MARROW BIOPSY N/A 2018    Procedure: Biopsy-bone marrow;  Surgeon: Antonio Desouza MD;  Location: [facility];  Service: Interventional Radiology;  Laterality: N/A;     SECTION      CHOLECYSTECTOMY      FACIAL RECONSTRUCTION SURGERY         Review of patient's allergies indicates:   Allergen Reactions    Livalo [pitavastatin calcium]     Percocet [oxycodone-acetaminophen]        No current facility-administered medications on file prior to encounter.     Current Outpatient Medications on File Prior to Encounter   Medication Sig    ALPRAZolam (XANAX) 2 MG Tab Take 2 mg by mouth 2 (two) times daily.    baclofen (LIORESAL) 20 MG tablet Take 20 mg by mouth 3 (three) times daily as needed.    bumetanide (BUMEX) 0.5 MG Tab Take 0.5 mg by mouth daily as needed.    calcium carbonate (OS-SUSSY) 600 mg calcium (1,500 mg) Tab Take 600 mg by mouth once.    cevimeline (EVOXAC) 30 mg capsule     dextroamphetamine-amphetamine (ADDERALL) 20 mg tablet Take 2 tablets by mouth 2 (two) times daily. (Patient taking differently: Take by mouth. 2 tablets in am and 2.5 tablets in afternoon)    gabapentin (NEURONTIN) 300 MG capsule Take 600 mg by mouth 2 (two) times daily.    hydroCHLOROthiazide (HYDRODIURIL) 25 MG tablet Take 25 mg by mouth daily as needed.    HYDROcodone-acetaminophen (NORCO) 5-325 mg per tablet TAKE 1/2 TO 1 TABLET BY MOUTH TWICE A DAY AS NEEDED FOR PAIN    hydrocortisone (CORTEF) 5 MG Tab 5 mg.    hydrOXYzine pamoate (VISTARIL) 50 MG Cap TK ONE C PO  QHS    Lactobacillus rhamnosus GG (CULTURELLE) 10 billion cell capsule Take 1 capsule by mouth once daily.    LORazepam (ATIVAN) 2 MG Tab TK 1 T PO BID    meloxicam (MOBIC) 15 MG tablet TK 1 T PO QD    multivitamin (THERAGRAN) per tablet Take 1 tablet by mouth once daily.    sumatriptan  (IMITREX STATDOSE) 6 mg/0.5 mL kit     testosterone 50 mg pellet Inject 175 mg into the muscle.    thioridazine (MELLARIL) 50 MG tablet Take 3 tablets by mouth every evening.    topiramate (TOPAMAX) 50 MG tablet TK 1 T PO TID    zolpidem (AMBIEN) 10 mg Tab TK 1 TO 2 TS PO QHS PRN     Family History       Problem Relation (Age of Onset)    Cancer Father    Hypertension Mother    Lung cancer Maternal Aunt          Tobacco Use    Smoking status: Never    Smokeless tobacco: Never   Substance and Sexual Activity    Alcohol use: No    Drug use: No    Sexual activity: Not on file     Review of Systems   Constitutional:  Negative for chills and fever.   Respiratory:  Negative for cough and shortness of breath.    Cardiovascular:  Negative for chest pain.   Gastrointestinal:  Negative for abdominal pain, constipation, diarrhea, nausea and vomiting.   Musculoskeletal:  Positive for arthralgias and myalgias.   Skin:  Positive for wound.   Objective:     Vital Signs (Most Recent):  Temp: 97.8 °F (36.6 °C) (04/07/23 1308)  Pulse: 77 (04/07/23 1453)  Resp: 19 (04/07/23 1341)  BP: (!) 175/96 (04/07/23 1453)  SpO2: 100 % (04/07/23 1453)   Vital Signs (24h Range):  Temp:  [97.8 °F (36.6 °C)] 97.8 °F (36.6 °C)  Pulse:  [77] 77  Resp:  [19-20] 19  SpO2:  [100 %] 100 %  BP: (155-175)/(71-96) 175/96     Weight: 60.8 kg (134 lb)  Body mass index is 18.17 kg/m².    Physical Exam  Vitals reviewed.   Constitutional:       General: She is not in acute distress.     Appearance: Normal appearance. She is not ill-appearing.   HENT:      Head: Normocephalic and atraumatic.   Eyes:      General:         Right eye: No discharge.         Left eye: No discharge.   Cardiovascular:      Rate and Rhythm: Normal rate and regular rhythm.      Pulses: Normal pulses.      Heart sounds: Normal heart sounds.   Pulmonary:      Effort: Pulmonary effort is normal. No respiratory distress.      Breath sounds: Normal breath sounds. No wheezing, rhonchi or rales.    Abdominal:      General: Abdomen is flat. Bowel sounds are normal. There is no distension.      Palpations: Abdomen is soft.      Tenderness: There is no abdominal tenderness. There is no guarding.   Musculoskeletal:         General: Deformity and signs of injury present.      Cervical back: Neck supple.      Comments: RLE shortened and externally rotated  RLE NVI & distal strength intact  Few skin tears RUE   Skin:     General: Skin is warm and dry.   Neurological:      General: No focal deficit present.      Mental Status: She is alert and oriented to person, place, and time.   Psychiatric:         Mood and Affect: Affect normal.         Behavior: Behavior normal.         Thought Content: Thought content normal.           Significant Labs: All pertinent labs within the past 24 hours have been reviewed.    Significant Imaging: I have reviewed all pertinent imaging results/findings within the past 24 hours.    Admission on 04/07/2023   Component Date Value Ref Range Status    WBC 04/07/2023 9.68  3.90 - 12.70 K/uL Final    RBC 04/07/2023 4.20  4.00 - 5.40 M/uL Final    Hemoglobin 04/07/2023 13.3  12.0 - 16.0 g/dL Final    Hematocrit 04/07/2023 38.9  37.0 - 48.5 % Final    MCV 04/07/2023 93  82 - 98 fL Final    MCH 04/07/2023 31.7 (H)  27.0 - 31.0 pg Final    MCHC 04/07/2023 34.2  32.0 - 36.0 g/dL Final    RDW 04/07/2023 13.8  11.5 - 14.5 % Final    Platelets 04/07/2023 169  150 - 450 K/uL Final    MPV 04/07/2023 11.2  9.2 - 12.9 fL Final    Immature Granulocytes 04/07/2023 0.6 (H)  0.0 - 0.5 % Final    Gran # (ANC) 04/07/2023 8.2 (H)  1.8 - 7.7 K/uL Final    Immature Grans (Abs) 04/07/2023 0.06 (H)  0.00 - 0.04 K/uL Final    Comment: Mild elevation in immature granulocytes is non specific and   can be seen in a variety of conditions including stress response,   acute inflammation, trauma and pregnancy. Correlation with other   laboratory and clinical findings is essential.      Lymph # 04/07/2023 0.8 (L)  1.0 -  4.8 K/uL Final    Mono # 04/07/2023 0.4  0.3 - 1.0 K/uL Final    Eos # 04/07/2023 0.1  0.0 - 0.5 K/uL Final    Baso # 04/07/2023 0.05  0.00 - 0.20 K/uL Final    nRBC 04/07/2023 0  0 /100 WBC Final    Gran % 04/07/2023 85.0 (H)  38.0 - 73.0 % Final    Lymph % 04/07/2023 8.7 (L)  18.0 - 48.0 % Final    Mono % 04/07/2023 4.5  4.0 - 15.0 % Final    Eosinophil % 04/07/2023 0.7  0.0 - 8.0 % Final    Basophil % 04/07/2023 0.5  0.0 - 1.9 % Final    Differential Method 04/07/2023 Automated   Final         Imaging Results              X-Ray Femur Ap/Lat Right (Final result)  Result time 04/07/23 14:49:31      Final result by Ruddy Preston MD (04/07/23 14:49:31)                   Impression:      As above.      Electronically signed by: Ruddy Preston  Date:    04/07/2023  Time:    14:49               Narrative:    EXAMINATION:  XR FEMUR 2 VIEW RIGHT    CLINICAL HISTORY:  Fracture of unspecified part of neck of right femur, initial encounter for closed fracture    TECHNIQUE:  AP and lateral views of the left femur were performed.    COMPARISON:  Right hip radiograph    FINDINGS:  Redemonstration of a mildly displaced and impacted right intertrochanteric femur fracture.  Right femoral head remains aligned within the acetabulum.  No definite additional acute fracture or dislocation.  Mild medial tibiofemoral joint space narrowing.  Heterotopic calcification projects over the right upper thigh soft tissues.                                       X-Ray Hip 2 or 3 views Right (with Pelvis when performed) (Final result)  Result time 04/07/23 14:40:29      Final result by Ruddy Preston MD (04/07/23 14:40:29)                   Impression:      Acute, mildly displaced and impacted intertrochanteric right femur fracture.  No dislocation.      Electronically signed by: Ruddy Preston  Date:    04/07/2023  Time:    14:40               Narrative:    EXAMINATION:  XR HIP WITH PELVIS WHEN PERFORMED, 2 OR 3  VIEWS RIGHT    CLINICAL HISTORY:  R leg  pain s/p fall;    TECHNIQUE:  AP pelvis and frog leg lateral view of the right hip were performed.    COMPARISON:  None    FINDINGS:  Osseous structures demonstrate diffuse demineralization.  Acute, mildly displaced and impacted intertrochanteric right femur fracture.  Right femoral head remains aligned with the acetabulum.  Left hip is maintained.  No definite other acute fracture or dislocation.  Levoscoliosis and degenerative change of the visualized lower lumbar spine.

## 2023-04-07 NOTE — ASSESSMENT & PLAN NOTE
Chronic, stable  - continue home gabapentin. Hold home nucynta. - [place]MP checked  - pain meds prn while inpatient

## 2023-04-07 NOTE — ED PROVIDER NOTES
Chief complaint:  Fall (Playing tennis ) and Groin Pain (Rt. Side )      HPI:  Norris Frank is a 72 y.o. female with hx prior lumbosacral spondylosis, prior compression fx, and spinal stenosis presenting with a fall while playing tennis on her right side with subsequent right leg pain.  Pain is primarily in the posterior proximal femoral region wrapping to the anterior region slightly.  It is worse with motion at the hip.  She arrives via EMS.  She denies numbness or weakness.  There is no other radiation or migration of pain.  She denies other injury including closed head injury or neck pain.    ROS: As per HPI and below:  No new back pain, abdominal pain, upper extremity injury, fever.    Review of patient's allergies indicates:   Allergen Reactions    Livalo [pitavastatin calcium]     Percocet [oxycodone-acetaminophen]        Current Discharge Medication List        CONTINUE these medications which have NOT CHANGED    Details   ALPRAZolam (XANAX) 2 MG Tab Take 2 mg by mouth 2 (two) times daily as needed.  Refills: 4      cevimeline (EVOXAC) 30 mg capsule once daily.  Refills: 0      dextroamphetamine-amphetamine (ADDERALL) 20 mg tablet Take 2 tablets by mouth 2 (two) times daily.  Qty: 120 tablet, Refills: 0    Associated Diagnoses: Attention deficit hyperactivity disorder (ADHD), predominantly hyperactive type      gabapentin (NEURONTIN) 300 MG capsule Take 600 mg by mouth every evening.  Refills: 6      hydrocortisone (CORTEF) 5 MG Tab 5 mg 2 (two) times daily.      LORazepam (ATIVAN) 2 MG Tab 2 mg nightly as needed.  Refills: 4      midodrine (PROAMATINE) 2.5 MG Tab Take 2.5 mg by mouth daily as needed.      multivitamin (THERAGRAN) per tablet Take 1 tablet by mouth once daily.      sumatriptan (IMITREX STATDOSE) 6 mg/0.5 mL kit daily as needed.  Refills: 0    Associated Diagnoses: Chronic tension-type headache, not intractable      testosterone 50 mg pellet Inject 175 mg into the muscle every 3 (three)  months.      thioridazine (MELLARIL) 50 MG tablet Take 3 tablets by mouth every evening.  Refills: 4             PMH:  As per HPI and below:  Past Medical History:   Diagnosis Date    ADHD (attention deficit hyperactivity disorder)     Hypothyroidism      Past Surgical History:   Procedure Laterality Date    BONE MARROW BIOPSY N/A 2018    Procedure: Biopsy-bone marrow;  Surgeon: Antonio Desouza MD;  Location: [facility];  Service: Interventional Radiology;  Laterality: N/A;     SECTION      CHOLECYSTECTOMY      FACIAL RECONSTRUCTION SURGERY         Social History     Socioeconomic History    Marital status:    Tobacco Use    Smoking status: Never    Smokeless tobacco: Never   Substance and Sexual Activity    Alcohol use: No    Drug use: No       Family History   Problem Relation Age of Onset    Hypertension Mother     Cancer Father     Lung cancer Maternal Aunt        Physical Exam:    Vitals:    23 1941   BP: (!) 153/89   Pulse: 78   Resp: 17   Temp: 97.1 °F (36.2 °C)     GENERAL:  No apparent distress.  Alert.    HEENT:  Moist mucous membranes.  Normocephalic and atraumatic.    NECK:  No swelling.  Midline trachea. No posterior midline tenderness to palpation.    CARDIOVASCULAR:  Regular rate and rhythm.  2+ radial pulses.    PULMONARY:  Lungs clear to auscultation bilaterally.  No wheezes, rales, or rhonci.    ABDOMEN:  Non-tender and non-distended.    EXTREMITIES:  Warm and well perfused.  Brisk capillary refill.  2+ DP and PT pulses.  Minor posterior proximal femoral tenderness to palpation extending to the lower gluteal region.  There is no deformity or palpable deficit.  Leg is held in mid flexion with difficulty with active or passive range of motion at the hip secondary to posterior pain.  There is no other leg tenderness to palpation.  Upper extremities are atraumatic.  NEUROLOGICAL:  Normal mental status.  Appropriate and conversant.  5/5 strength and sensation.    SKIN:  No rashes  or ecchymoses.    BACK:  Atraumatic.  No lower back tenderness to palpation.      Labs Reviewed   CBC W/ AUTO DIFFERENTIAL - Abnormal; Notable for the following components:       Result Value    MCH 31.7 (*)     Immature Granulocytes 0.6 (*)     Gran # (ANC) 8.2 (*)     Immature Grans (Abs) 0.06 (*)     Lymph # 0.8 (*)     Gran % 85.0 (*)     Lymph % 8.7 (*)     All other components within normal limits   BASIC METABOLIC PANEL       Current Discharge Medication List        CONTINUE these medications which have NOT CHANGED    Details   ALPRAZolam (XANAX) 2 MG Tab Take 2 mg by mouth 2 (two) times daily as needed.  Refills: 4      cevimeline (EVOXAC) 30 mg capsule once daily.  Refills: 0      dextroamphetamine-amphetamine (ADDERALL) 20 mg tablet Take 2 tablets by mouth 2 (two) times daily.  Qty: 120 tablet, Refills: 0    Associated Diagnoses: Attention deficit hyperactivity disorder (ADHD), predominantly hyperactive type      gabapentin (NEURONTIN) 300 MG capsule Take 600 mg by mouth every evening.  Refills: 6      hydrocortisone (CORTEF) 5 MG Tab 5 mg 2 (two) times daily.      LORazepam (ATIVAN) 2 MG Tab 2 mg nightly as needed.  Refills: 4      midodrine (PROAMATINE) 2.5 MG Tab Take 2.5 mg by mouth daily as needed.      multivitamin (THERAGRAN) per tablet Take 1 tablet by mouth once daily.      sumatriptan (IMITREX STATDOSE) 6 mg/0.5 mL kit daily as needed.  Refills: 0    Associated Diagnoses: Chronic tension-type headache, not intractable      testosterone 50 mg pellet Inject 175 mg into the muscle every 3 (three) months.      thioridazine (MELLARIL) 50 MG tablet Take 3 tablets by mouth every evening.  Refills: 4             Orders Placed This Encounter   Procedures    X-Ray Hip 2 or 3 views Right (with Pelvis when performed)    X-Ray Femur Ap/Lat Right    Complete Blood Count (CBC)    Basic Metabolic Panel (BMP)    Comprehensive Metabolic Panel (CMP)    CBC with Automated Differential    Diet NPO    Diet Adult  Regular (IDDSI Level 7)    Vital Signs    Bladder scan    Notify Physician    Place sequential compression device    Recheck Blood Glucose:    Nursing communication    Skin Tear - Apply foam dressing now:  Cleanse area with normal saline, reapproximate skin flap, apply a no sting barrier film to the periwound skin, apply foam dressing.    Skin Tear - Change foam dressing weekly:  Cleanse area with normal saline, apply a no sting barrier film to the periwound skin, apply foam dressing    Full code    Inpatient consult to Orthopedic Surgery    IP Wound Care consult Nurse    EKG 12-lead    EKG 12-lead    Insert saline lock    Possible Hospitalization    Admit to Inpatient       Imaging Results              X-Ray Femur Ap/Lat Right (Final result)  Result time 04/07/23 14:49:31      Final result by Ruddy Preston MD (04/07/23 14:49:31)                   Impression:      As above.      Electronically signed by: Ruddy Preston  Date:    04/07/2023  Time:    14:49               Narrative:    EXAMINATION:  XR FEMUR 2 VIEW RIGHT    CLINICAL HISTORY:  Fracture of unspecified part of neck of right femur, initial encounter for closed fracture    TECHNIQUE:  AP and lateral views of the left femur were performed.    COMPARISON:  Right hip radiograph    FINDINGS:  Redemonstration of a mildly displaced and impacted right intertrochanteric femur fracture.  Right femoral head remains aligned within the acetabulum.  No definite additional acute fracture or dislocation.  Mild medial tibiofemoral joint space narrowing.  Heterotopic calcification projects over the right upper thigh soft tissues.                                       X-Ray Hip 2 or 3 views Right (with Pelvis when performed) (Final result)  Result time 04/07/23 14:40:29      Final result by Ruddy Preston MD (04/07/23 14:40:29)                   Impression:      Acute, mildly displaced and impacted intertrochanteric right femur fracture.  No dislocation.      Electronically signed  by: Ruddy Preston  Date:    04/07/2023  Time:    14:40               Narrative:    EXAMINATION:  XR HIP WITH PELVIS WHEN PERFORMED, 2 OR 3  VIEWS RIGHT    CLINICAL HISTORY:  R leg pain s/p fall;    TECHNIQUE:  AP pelvis and frog leg lateral view of the right hip were performed.    COMPARISON:  None    FINDINGS:  Osseous structures demonstrate diffuse demineralization.  Acute, mildly displaced and impacted intertrochanteric right femur fracture.  Right femoral head remains aligned with the acetabulum.  Left hip is maintained.  No definite other acute fracture or dislocation.  Levoscoliosis and degenerative change of the visualized lower lumbar spine.                                  (radiology reading, visualized by me)      ED Course as of 04/07/23 2122 Fri Apr 07, 2023   1411 XR R hip:  Mildly angulated R intertrochanteric hip fx. (Independently interpreted by me) [MR]   1418 Discussed with Dr. Scott orthopedics who advised obtain additional femur films and admit to medicine. [MR]   1427 EKG:  NSR, rate of 73, normal intervals and L axis.  There are no acute ST or T wave changes suggestive of acute ischemia or infarction.  (Independently interpreted by me) [MR]      ED Course User Index  [MR] Adonis Main MD       MDM:    72 y.o. female with right leg pain after fall primarily in the posterior femoral region proximally.  X-ray performed to exclude fracture or dislocation.  There is no distal neurovascular compromise.  Analgesia given here for pain pending imaging.    X-ray shows intertrochanteric hip fracture.  She will be admitted to Medicine with Orthopedic consultation.    Diagnoses:    1. Right leg hip fracture, closed       Adonis Main MD  04/07/23 2122

## 2023-04-07 NOTE — H&P
[facility] Medicine  History & Physical    Patient Name: Norris Frank  MRN: [id number]  Patient Class: IP- Inpatient  Admission Date: 2023  Attending Physician: Francisco Javier Wolfe MD  Primary Care Provider: Kathy Xiong NP         Patient information was obtained from patient, spouse/SO, past medical records and ER records.     Subjective:     Principal Problem:Displaced intertrochanteric fracture of right femur, initial encounter for closed fracture    Chief Complaint:   Chief Complaint   Patient presents with    Fall     Playing tennis     Groin Pain     Rt. Side         HPI: 72F with PMH adrenal insufficiency, ADD, anxiety, chronic neck and back pain who presents with her  for right hip pain after a fall earlier today while playing tennis. She had extreme pain and difficulty walking and generally moving the RLE after the fall. Pain is slightly improved now after pain meds in ER. Found to have an acute displaced and impacted right intertrochanteric femoral fracture on xray imaging. ER MD spoke to ortho MD who agrees to consult and likely surgery tomorrow . Admitted to hospital medicine service for continued management.      Past Medical History:   Diagnosis Date    ADHD (attention deficit hyperactivity disorder)     Hypothyroidism        Past Surgical History:   Procedure Laterality Date    BONE MARROW BIOPSY N/A 2018    Procedure: Biopsy-bone marrow;  Surgeon: Antonio Desouza MD;  Location: [facility];  Service: Interventional Radiology;  Laterality: N/A;     SECTION      CHOLECYSTECTOMY      FACIAL RECONSTRUCTION SURGERY         Review of patient's allergies indicates:   Allergen Reactions    Livalo [pitavastatin calcium]     Percocet [oxycodone-acetaminophen]        No current facility-administered medications on file prior to encounter.     Current Outpatient Medications on File Prior to Encounter   Medication Sig    ALPRAZolam (XANAX) 2  MG Tab Take 2 mg by mouth 2 (two) times daily.    baclofen (LIORESAL) 20 MG tablet Take 20 mg by mouth 3 (three) times daily as needed.    bumetanide (BUMEX) 0.5 MG Tab Take 0.5 mg by mouth daily as needed.    calcium carbonate (OS-SUSSY) 600 mg calcium (1,500 mg) Tab Take 600 mg by mouth once.    cevimeline (EVOXAC) 30 mg capsule     dextroamphetamine-amphetamine (ADDERALL) 20 mg tablet Take 2 tablets by mouth 2 (two) times daily. (Patient taking differently: Take by mouth. 2 tablets in am and 2.5 tablets in afternoon)    gabapentin (NEURONTIN) 300 MG capsule Take 600 mg by mouth 2 (two) times daily.    hydroCHLOROthiazide (HYDRODIURIL) 25 MG tablet Take 25 mg by mouth daily as needed.    HYDROcodone-acetaminophen (NORCO) 5-325 mg per tablet TAKE 1/2 TO 1 TABLET BY MOUTH TWICE A DAY AS NEEDED FOR PAIN    hydrocortisone (CORTEF) 5 MG Tab 5 mg.    hydrOXYzine pamoate (VISTARIL) 50 MG Cap TK ONE C PO  QHS    Lactobacillus rhamnosus GG (CULTURELLE) 10 billion cell capsule Take 1 capsule by mouth once daily.    LORazepam (ATIVAN) 2 MG Tab TK 1 T PO BID    meloxicam (MOBIC) 15 MG tablet TK 1 T PO QD    multivitamin (THERAGRAN) per tablet Take 1 tablet by mouth once daily.    sumatriptan (IMITREX STATDOSE) 6 mg/0.5 mL kit     testosterone 50 mg pellet Inject 175 mg into the muscle.    thioridazine (MELLARIL) 50 MG tablet Take 3 tablets by mouth every evening.    topiramate (TOPAMAX) 50 MG tablet TK 1 T PO TID    zolpidem (AMBIEN) 10 mg Tab TK 1 TO 2 TS PO QHS PRN     Family History       Problem Relation (Age of Onset)    Cancer Father    Hypertension Mother    Lung cancer Maternal Aunt          Tobacco Use    Smoking status: Never    Smokeless tobacco: Never   Substance and Sexual Activity    Alcohol use: No    Drug use: No    Sexual activity: Not on file     Review of Systems   Constitutional:  Negative for chills and fever.   Respiratory:  Negative for cough and shortness of breath.     Cardiovascular:  Negative for chest pain.   Gastrointestinal:  Negative for abdominal pain, constipation, diarrhea, nausea and vomiting.   Musculoskeletal:  Positive for arthralgias and myalgias.   Skin:  Positive for wound.   Objective:     Vital Signs (Most Recent):  Temp: 97.8 °F (36.6 °C) (04/07/23 1308)  Pulse: 77 (04/07/23 1453)  Resp: 19 (04/07/23 1341)  BP: (!) 175/96 (04/07/23 1453)  SpO2: 100 % (04/07/23 1453)   Vital Signs (24h Range):  Temp:  [97.8 °F (36.6 °C)] 97.8 °F (36.6 °C)  Pulse:  [77] 77  Resp:  [19-20] 19  SpO2:  [100 %] 100 %  BP: (155-175)/(71-96) 175/96     Weight: 60.8 kg (134 lb)  Body mass index is 18.17 kg/m².    Physical Exam  Vitals reviewed.   Constitutional:       General: She is not in acute distress.     Appearance: Normal appearance. She is not ill-appearing.   HENT:      Head: Normocephalic and atraumatic.   Eyes:      General:         Right eye: No discharge.         Left eye: No discharge.   Cardiovascular:      Rate and Rhythm: Normal rate and regular rhythm.      Pulses: Normal pulses.      Heart sounds: Normal heart sounds.   Pulmonary:      Effort: Pulmonary effort is normal. No respiratory distress.      Breath sounds: Normal breath sounds. No wheezing, rhonchi or rales.   Abdominal:      General: Abdomen is flat. Bowel sounds are normal. There is no distension.      Palpations: Abdomen is soft.      Tenderness: There is no abdominal tenderness. There is no guarding.   Musculoskeletal:         General: Deformity and signs of injury present.      Cervical back: Neck supple.      Comments: RLE shortened and externally rotated  RLE NVI & distal strength intact  Few skin tears RUE   Skin:     General: Skin is warm and dry.   Neurological:      General: No focal deficit present.      Mental Status: She is alert and oriented to person, place, and time.   Psychiatric:         Mood and Affect: Affect normal.         Behavior: Behavior normal.         Thought Content: Thought  content normal.           Significant Labs: All pertinent labs within the past 24 hours have been reviewed.    Significant Imaging: I have reviewed all pertinent imaging results/findings within the past 24 hours.    Admission on 04/07/2023   Component Date Value Ref Range Status    WBC 04/07/2023 9.68  3.90 - 12.70 K/uL Final    RBC 04/07/2023 4.20  4.00 - 5.40 M/uL Final    Hemoglobin 04/07/2023 13.3  12.0 - 16.0 g/dL Final    Hematocrit 04/07/2023 38.9  37.0 - 48.5 % Final    MCV 04/07/2023 93  82 - 98 fL Final    MCH 04/07/2023 31.7 (H)  27.0 - 31.0 pg Final    MCHC 04/07/2023 34.2  32.0 - 36.0 g/dL Final    RDW 04/07/2023 13.8  11.5 - 14.5 % Final    Platelets 04/07/2023 169  150 - 450 K/uL Final    MPV 04/07/2023 11.2  9.2 - 12.9 fL Final    Immature Granulocytes 04/07/2023 0.6 (H)  0.0 - 0.5 % Final    Gran # (ANC) 04/07/2023 8.2 (H)  1.8 - 7.7 K/uL Final    Immature Grans (Abs) 04/07/2023 0.06 (H)  0.00 - 0.04 K/uL Final    Comment: Mild elevation in immature granulocytes is non specific and   can be seen in a variety of conditions including stress response,   acute inflammation, trauma and pregnancy. Correlation with other   laboratory and clinical findings is essential.      Lymph # 04/07/2023 0.8 (L)  1.0 - 4.8 K/uL Final    Mono # 04/07/2023 0.4  0.3 - 1.0 K/uL Final    Eos # 04/07/2023 0.1  0.0 - 0.5 K/uL Final    Baso # 04/07/2023 0.05  0.00 - 0.20 K/uL Final    nRBC 04/07/2023 0  0 /100 WBC Final    Gran % 04/07/2023 85.0 (H)  38.0 - 73.0 % Final    Lymph % 04/07/2023 8.7 (L)  18.0 - 48.0 % Final    Mono % 04/07/2023 4.5  4.0 - 15.0 % Final    Eosinophil % 04/07/2023 0.7  0.0 - 8.0 % Final    Basophil % 04/07/2023 0.5  0.0 - 1.9 % Final    Differential Method 04/07/2023 Automated   Final         Imaging Results              X-Ray Femur Ap/Lat Right (Final result)  Result time 04/07/23 14:49:31      Final result by Ruddy Preston MD (04/07/23 14:49:31)                   Impression:       As above.      Electronically signed by: Ruddy Preston  Date:    04/07/2023  Time:    14:49               Narrative:    EXAMINATION:  XR FEMUR 2 VIEW RIGHT    CLINICAL HISTORY:  Fracture of unspecified part of neck of right femur, initial encounter for closed fracture    TECHNIQUE:  AP and lateral views of the left femur were performed.    COMPARISON:  Right hip radiograph    FINDINGS:  Redemonstration of a mildly displaced and impacted right intertrochanteric femur fracture.  Right femoral head remains aligned within the acetabulum.  No definite additional acute fracture or dislocation.  Mild medial tibiofemoral joint space narrowing.  Heterotopic calcification projects over the right upper thigh soft tissues.                                       X-Ray Hip 2 or 3 views Right (with Pelvis when performed) (Final result)  Result time 04/07/23 14:40:29      Final result by Ruddy Preston MD (04/07/23 14:40:29)                   Impression:      Acute, mildly displaced and impacted intertrochanteric right femur fracture.  No dislocation.      Electronically signed by: Ruddy Preston  Date:    04/07/2023  Time:    14:40               Narrative:    EXAMINATION:  XR HIP WITH PELVIS WHEN PERFORMED, 2 OR 3  VIEWS RIGHT    CLINICAL HISTORY:  R leg pain s/p fall;    TECHNIQUE:  AP pelvis and frog leg lateral view of the right hip were performed.    COMPARISON:  None    FINDINGS:  Osseous structures demonstrate diffuse demineralization.  Acute, mildly displaced and impacted intertrochanteric right femur fracture.  Right femoral head remains aligned with the acetabulum.  Left hip is maintained.  No definite other acute fracture or dislocation.  Levoscoliosis and degenerative change of the visualized lower lumbar spine.                                        Assessment/Plan:     * Displaced intertrochanteric fracture of right femur, initial encounter for closed fracture  Per XR imaging  - pain med prn  - consult ortho: plan likely  surgery 4/8  - NPO midnight    Adrenal insufficiency  Chronic, stable  - continue home cortef and fludrocortisone    Chronic neck and back pain  Chronic, stable  - continue home gabapentin. Hold home nucynta. - LAPMP checked  - pain meds prn while inpatient    Anxiety  Chronic, stable  - continue home benzo prn - LAPMP checked    Attention deficit disorder (ADD) without hyperactivity  Chronic, stable  - continue home adderall            THE PATIENT PLANS TO BRING HER OWN HOME MEDS TO TAKE WHILE INPATIENT. A MED REC WILL NEED TO BE DONE FOR THIS AND PHARMACY NOTIFIED.        VTE Risk Mitigation (From admission, onward)    MATILDEs                     Francisco Javier Wolfe MD  Department of Hospital Medicine  [facility]   Melolabial Interpolation Flap Text: A decision was made to reconstruct the defect utilizing an interpolation axial flap and a staged reconstruction.  A telfa template was made of the defect.  This telfa template was then used to outline the melolabial interpolation flap.  The donor area for the pedicle flap was then injected with anesthesia.  The flap was excised through the skin and subcutaneous tissue down to the layer of the underlying musculature.  The pedicle flap was carefully excised within this deep plane to maintain its blood supply.  The edges of the donor site were undermined.   The donor site was closed in a primary fashion.  The pedicle was then rotated into position and sutured.  Once the tube was sutured into place, adequate blood supply was confirmed with blanching and refill.  The pedicle was then wrapped with xeroform gauze and dressed appropriately with a telfa and gauze bandage to ensure continued blood supply and protect the attached pedicle.

## 2023-04-08 ENCOUNTER — ANESTHESIA EVENT (OUTPATIENT)
Dept: SURGERY | Facility: HOSPITAL | Age: 73
DRG: 481 | End: 2023-04-08
Payer: COMMERCIAL

## 2023-04-08 ENCOUNTER — ANESTHESIA (OUTPATIENT)
Dept: SURGERY | Facility: HOSPITAL | Age: 73
DRG: 481 | End: 2023-04-08
Payer: COMMERCIAL

## 2023-04-08 PROBLEM — M54.14 THORACIC RADICULITIS: Status: ACTIVE | Noted: 2020-03-24

## 2023-04-08 PROBLEM — S22.000A COMPRESSION FRACTURE OF THORACIC VERTEBRA: Status: ACTIVE | Noted: 2019-09-27

## 2023-04-08 PROBLEM — M47.814 THORACIC SPONDYLOSIS WITHOUT MYELOPATHY: Status: ACTIVE | Noted: 2020-06-08

## 2023-04-08 PROBLEM — M48.02 SPINAL STENOSIS IN CERVICAL REGION: Status: ACTIVE | Noted: 2021-06-16

## 2023-04-08 PROBLEM — M47.817 LUMBOSACRAL SPONDYLOSIS WITHOUT MYELOPATHY: Status: ACTIVE | Noted: 2020-12-09

## 2023-04-08 PROBLEM — M85.80 OSTEOPENIA: Status: ACTIVE | Noted: 2023-04-08

## 2023-04-08 PROBLEM — M54.17 LUMBOSACRAL RADICULITIS: Status: ACTIVE | Noted: 2021-02-26

## 2023-04-08 PROBLEM — D69.6 THROMBOCYTOPENIA: Status: RESOLVED | Noted: 2018-08-21 | Resolved: 2023-04-08

## 2023-04-08 LAB
ALBUMIN SERPL BCP-MCNC: 3.8 G/DL (ref 3.5–5.2)
ALP SERPL-CCNC: 62 U/L (ref 55–135)
ALT SERPL W/O P-5'-P-CCNC: 15 U/L (ref 10–44)
ANION GAP SERPL CALC-SCNC: 9 MMOL/L (ref 8–16)
AST SERPL-CCNC: 20 U/L (ref 10–40)
BASOPHILS # BLD AUTO: 0.05 K/UL (ref 0–0.2)
BASOPHILS NFR BLD: 0.6 % (ref 0–1.9)
BILIRUB SERPL-MCNC: 1.3 MG/DL (ref 0.1–1)
BUN SERPL-MCNC: 8 MG/DL (ref 8–23)
CALCIUM SERPL-MCNC: 9 MG/DL (ref 8.7–10.5)
CHLORIDE SERPL-SCNC: 102 MMOL/L (ref 95–110)
CO2 SERPL-SCNC: 27 MMOL/L (ref 23–29)
CREAT SERPL-MCNC: 0.7 MG/DL (ref 0.5–1.4)
DIFFERENTIAL METHOD: ABNORMAL
EOSINOPHIL # BLD AUTO: 0.1 K/UL (ref 0–0.5)
EOSINOPHIL NFR BLD: 1.3 % (ref 0–8)
ERYTHROCYTE [DISTWIDTH] IN BLOOD BY AUTOMATED COUNT: 13.6 % (ref 11.5–14.5)
EST. GFR  (NO RACE VARIABLE): >60 ML/MIN/1.73 M^2
GLUCOSE SERPL-MCNC: 102 MG/DL (ref 70–110)
HCT VFR BLD AUTO: 39.7 % (ref 37–48.5)
HGB BLD-MCNC: 13.7 G/DL (ref 12–16)
IMM GRANULOCYTES # BLD AUTO: 0.05 K/UL (ref 0–0.04)
IMM GRANULOCYTES NFR BLD AUTO: 0.6 % (ref 0–0.5)
LYMPHOCYTES # BLD AUTO: 1.3 K/UL (ref 1–4.8)
LYMPHOCYTES NFR BLD: 14.7 % (ref 18–48)
MCH RBC QN AUTO: 31.7 PG (ref 27–31)
MCHC RBC AUTO-ENTMCNC: 34.5 G/DL (ref 32–36)
MCV RBC AUTO: 92 FL (ref 82–98)
MONOCYTES # BLD AUTO: 1 K/UL (ref 0.3–1)
MONOCYTES NFR BLD: 11.5 % (ref 4–15)
NEUTROPHILS # BLD AUTO: 6.2 K/UL (ref 1.8–7.7)
NEUTROPHILS NFR BLD: 71.3 % (ref 38–73)
NRBC BLD-RTO: 0 /100 WBC
PLATELET # BLD AUTO: 162 K/UL (ref 150–450)
PMV BLD AUTO: 11.2 FL (ref 9.2–12.9)
POTASSIUM SERPL-SCNC: 3.7 MMOL/L (ref 3.5–5.1)
PROT SERPL-MCNC: 6.7 G/DL (ref 6–8.4)
RBC # BLD AUTO: 4.32 M/UL (ref 4–5.4)
SODIUM SERPL-SCNC: 138 MMOL/L (ref 136–145)
WBC # BLD AUTO: 8.73 K/UL (ref 3.9–12.7)

## 2023-04-08 PROCEDURE — D9220A PRA ANESTHESIA: ICD-10-PCS | Mod: CRNA,,, | Performed by: NURSE ANESTHETIST, CERTIFIED REGISTERED

## 2023-04-08 PROCEDURE — 63600175 PHARM REV CODE 636 W HCPCS: Performed by: NURSE PRACTITIONER

## 2023-04-08 PROCEDURE — 85025 COMPLETE CBC W/AUTO DIFF WBC: CPT | Performed by: STUDENT IN AN ORGANIZED HEALTH CARE EDUCATION/TRAINING PROGRAM

## 2023-04-08 PROCEDURE — 51702 INSERT TEMP BLADDER CATH: CPT

## 2023-04-08 PROCEDURE — 99900035 HC TECH TIME PER 15 MIN (STAT)

## 2023-04-08 PROCEDURE — 25000003 PHARM REV CODE 250: Performed by: NURSE ANESTHETIST, CERTIFIED REGISTERED

## 2023-04-08 PROCEDURE — D9220A PRA ANESTHESIA: ICD-10-PCS | Mod: ANES,,, | Performed by: ANESTHESIOLOGY

## 2023-04-08 PROCEDURE — 36000711: Performed by: ORTHOPAEDIC SURGERY

## 2023-04-08 PROCEDURE — 94799 UNLISTED PULMONARY SVC/PX: CPT

## 2023-04-08 PROCEDURE — 80053 COMPREHEN METABOLIC PANEL: CPT | Performed by: STUDENT IN AN ORGANIZED HEALTH CARE EDUCATION/TRAINING PROGRAM

## 2023-04-08 PROCEDURE — 36000710: Performed by: ORTHOPAEDIC SURGERY

## 2023-04-08 PROCEDURE — 25000003 PHARM REV CODE 250: Performed by: ORTHOPAEDIC SURGERY

## 2023-04-08 PROCEDURE — 71000039 HC RECOVERY, EACH ADD'L HOUR: Performed by: ORTHOPAEDIC SURGERY

## 2023-04-08 PROCEDURE — 12000002 HC ACUTE/MED SURGE SEMI-PRIVATE ROOM

## 2023-04-08 PROCEDURE — 63600175 PHARM REV CODE 636 W HCPCS: Performed by: ANESTHESIOLOGY

## 2023-04-08 PROCEDURE — 63600175 PHARM REV CODE 636 W HCPCS: Performed by: NURSE ANESTHETIST, CERTIFIED REGISTERED

## 2023-04-08 PROCEDURE — 71000033 HC RECOVERY, INTIAL HOUR: Performed by: ORTHOPAEDIC SURGERY

## 2023-04-08 PROCEDURE — D9220A PRA ANESTHESIA: Mod: ANES,,, | Performed by: ANESTHESIOLOGY

## 2023-04-08 PROCEDURE — 63600175 PHARM REV CODE 636 W HCPCS: Performed by: ORTHOPAEDIC SURGERY

## 2023-04-08 PROCEDURE — 37000008 HC ANESTHESIA 1ST 15 MINUTES: Performed by: ORTHOPAEDIC SURGERY

## 2023-04-08 PROCEDURE — 27200651 HC AIRWAY, LMA: Performed by: ANESTHESIOLOGY

## 2023-04-08 PROCEDURE — C1713 ANCHOR/SCREW BN/BN,TIS/BN: HCPCS | Performed by: ORTHOPAEDIC SURGERY

## 2023-04-08 PROCEDURE — 63600175 PHARM REV CODE 636 W HCPCS: Performed by: STUDENT IN AN ORGANIZED HEALTH CARE EDUCATION/TRAINING PROGRAM

## 2023-04-08 PROCEDURE — D9220A PRA ANESTHESIA: Mod: CRNA,,, | Performed by: NURSE ANESTHETIST, CERTIFIED REGISTERED

## 2023-04-08 PROCEDURE — 25000003 PHARM REV CODE 250: Performed by: NURSE PRACTITIONER

## 2023-04-08 PROCEDURE — C1769 GUIDE WIRE: HCPCS | Performed by: ORTHOPAEDIC SURGERY

## 2023-04-08 PROCEDURE — 36415 COLL VENOUS BLD VENIPUNCTURE: CPT | Performed by: STUDENT IN AN ORGANIZED HEALTH CARE EDUCATION/TRAINING PROGRAM

## 2023-04-08 PROCEDURE — 37000009 HC ANESTHESIA EA ADD 15 MINS: Performed by: ORTHOPAEDIC SURGERY

## 2023-04-08 DEVICE — BLADE HELICAL PERF GOLD 90MM: Type: IMPLANTABLE DEVICE | Site: FEMUR | Status: FUNCTIONAL

## 2023-04-08 DEVICE — SCREW LOCK T25 5.0X38MM: Type: IMPLANTABLE DEVICE | Site: FEMUR | Status: FUNCTIONAL

## 2023-04-08 RX ORDER — ASPIRIN 325 MG
325 TABLET ORAL 2 TIMES DAILY
Status: DISCONTINUED | OUTPATIENT
Start: 2023-04-08 | End: 2023-04-10

## 2023-04-08 RX ORDER — DIPHENHYDRAMINE HYDROCHLORIDE 50 MG/ML
12.5 INJECTION INTRAMUSCULAR; INTRAVENOUS EVERY 6 HOURS PRN
Status: DISCONTINUED | OUTPATIENT
Start: 2023-04-08 | End: 2023-04-08

## 2023-04-08 RX ORDER — CEFAZOLIN SODIUM 2 G/50ML
2 SOLUTION INTRAVENOUS
Status: COMPLETED | OUTPATIENT
Start: 2023-04-08 | End: 2023-04-09

## 2023-04-08 RX ORDER — ENOXAPARIN SODIUM 100 MG/ML
40 INJECTION SUBCUTANEOUS EVERY 24 HOURS
Status: DISCONTINUED | OUTPATIENT
Start: 2023-04-08 | End: 2023-04-10 | Stop reason: HOSPADM

## 2023-04-08 RX ORDER — ONDANSETRON 2 MG/ML
4 INJECTION INTRAMUSCULAR; INTRAVENOUS ONCE
Status: DISCONTINUED | OUTPATIENT
Start: 2023-04-08 | End: 2023-04-08

## 2023-04-08 RX ORDER — PHENYLEPHRINE HYDROCHLORIDE 10 MG/ML
INJECTION INTRAVENOUS
Status: DISCONTINUED | OUTPATIENT
Start: 2023-04-08 | End: 2023-04-08

## 2023-04-08 RX ORDER — SODIUM CHLORIDE 9 MG/ML
INJECTION, SOLUTION INTRAVENOUS CONTINUOUS
Status: DISCONTINUED | OUTPATIENT
Start: 2023-04-08 | End: 2023-04-09

## 2023-04-08 RX ORDER — HYDROMORPHONE HYDROCHLORIDE 2 MG/ML
0.2 INJECTION, SOLUTION INTRAMUSCULAR; INTRAVENOUS; SUBCUTANEOUS EVERY 5 MIN PRN
Status: DISCONTINUED | OUTPATIENT
Start: 2023-04-08 | End: 2023-04-08

## 2023-04-08 RX ORDER — SODIUM CHLORIDE 0.9 % (FLUSH) 0.9 %
5 SYRINGE (ML) INJECTION
Status: DISCONTINUED | OUTPATIENT
Start: 2023-04-08 | End: 2023-04-10 | Stop reason: HOSPADM

## 2023-04-08 RX ORDER — SODIUM CHLORIDE 9 MG/ML
INJECTION, SOLUTION INTRAVENOUS CONTINUOUS
Status: CANCELLED | OUTPATIENT
Start: 2023-04-08

## 2023-04-08 RX ORDER — FENTANYL CITRATE 50 UG/ML
25 INJECTION, SOLUTION INTRAMUSCULAR; INTRAVENOUS EVERY 5 MIN PRN
Status: COMPLETED | OUTPATIENT
Start: 2023-04-08 | End: 2023-04-08

## 2023-04-08 RX ORDER — ONDANSETRON 2 MG/ML
INJECTION INTRAMUSCULAR; INTRAVENOUS
Status: DISCONTINUED | OUTPATIENT
Start: 2023-04-08 | End: 2023-04-08

## 2023-04-08 RX ORDER — CEFAZOLIN SODIUM 1 G/3ML
INJECTION, POWDER, FOR SOLUTION INTRAMUSCULAR; INTRAVENOUS
Status: DISCONTINUED | OUTPATIENT
Start: 2023-04-08 | End: 2023-04-08

## 2023-04-08 RX ORDER — DEXAMETHASONE SODIUM PHOSPHATE 4 MG/ML
INJECTION, SOLUTION INTRA-ARTICULAR; INTRALESIONAL; INTRAMUSCULAR; INTRAVENOUS; SOFT TISSUE
Status: DISCONTINUED | OUTPATIENT
Start: 2023-04-08 | End: 2023-04-08

## 2023-04-08 RX ORDER — PROPOFOL 10 MG/ML
VIAL (ML) INTRAVENOUS
Status: DISCONTINUED | OUTPATIENT
Start: 2023-04-08 | End: 2023-04-08

## 2023-04-08 RX ORDER — MEPERIDINE HYDROCHLORIDE 50 MG/ML
12.5 INJECTION INTRAMUSCULAR; INTRAVENOUS; SUBCUTANEOUS ONCE
Status: DISCONTINUED | OUTPATIENT
Start: 2023-04-08 | End: 2023-04-08

## 2023-04-08 RX ORDER — CEFAZOLIN SODIUM 2 G/50ML
2 SOLUTION INTRAVENOUS
Status: CANCELLED | OUTPATIENT
Start: 2023-04-08

## 2023-04-08 RX ORDER — LIDOCAINE HYDROCHLORIDE 20 MG/ML
INJECTION INTRAVENOUS
Status: DISCONTINUED | OUTPATIENT
Start: 2023-04-08 | End: 2023-04-08

## 2023-04-08 RX ORDER — FENTANYL CITRATE 50 UG/ML
INJECTION, SOLUTION INTRAMUSCULAR; INTRAVENOUS
Status: DISCONTINUED | OUTPATIENT
Start: 2023-04-08 | End: 2023-04-08

## 2023-04-08 RX ORDER — KETOROLAC TROMETHAMINE 30 MG/ML
INJECTION, SOLUTION INTRAMUSCULAR; INTRAVENOUS
Status: DISCONTINUED | OUTPATIENT
Start: 2023-04-08 | End: 2023-04-08

## 2023-04-08 RX ORDER — EPINEPHRINE 0.1 MG/ML
INJECTION INTRAVENOUS
Status: DISCONTINUED | OUTPATIENT
Start: 2023-04-08 | End: 2023-04-08

## 2023-04-08 RX ORDER — EPHEDRINE SULFATE 50 MG/ML
INJECTION, SOLUTION INTRAVENOUS
Status: DISCONTINUED | OUTPATIENT
Start: 2023-04-08 | End: 2023-04-08

## 2023-04-08 RX ADMIN — EPINEPHRINE 20 MCG: 0.1 INJECTION INTRACARDIAC; INTRAVENOUS at 01:04

## 2023-04-08 RX ADMIN — ENOXAPARIN SODIUM 40 MG: 40 INJECTION SUBCUTANEOUS at 04:04

## 2023-04-08 RX ADMIN — PHENYLEPHRINE HYDROCHLORIDE 200 MCG: 10 INJECTION INTRAVENOUS at 01:04

## 2023-04-08 RX ADMIN — SODIUM CHLORIDE: 9 INJECTION, SOLUTION INTRAVENOUS at 03:04

## 2023-04-08 RX ADMIN — EPINEPHRINE 20 MCG: 0.1 INJECTION INTRACARDIAC; INTRAVENOUS at 02:04

## 2023-04-08 RX ADMIN — LIDOCAINE HYDROCHLORIDE 100 MG: 20 INJECTION, SOLUTION INTRAVENOUS at 01:04

## 2023-04-08 RX ADMIN — ONDANSETRON 4 MG: 2 INJECTION INTRAMUSCULAR; INTRAVENOUS at 01:04

## 2023-04-08 RX ADMIN — EPINEPHRINE 10 MCG: 0.1 INJECTION INTRACARDIAC; INTRAVENOUS at 01:04

## 2023-04-08 RX ADMIN — PROMETHAZINE HYDROCHLORIDE 12.5 MG: 25 INJECTION INTRAMUSCULAR; INTRAVENOUS at 02:04

## 2023-04-08 RX ADMIN — CEFAZOLIN SODIUM 2 G: 2 SOLUTION INTRAVENOUS at 06:04

## 2023-04-08 RX ADMIN — KETOROLAC TROMETHAMINE 30 MG: 30 INJECTION, SOLUTION INTRAMUSCULAR; INTRAVENOUS at 01:04

## 2023-04-08 RX ADMIN — CEFAZOLIN 2 G: 1 INJECTION, POWDER, FOR SOLUTION INTRAVENOUS at 01:04

## 2023-04-08 RX ADMIN — MORPHINE SULFATE 6 MG: 2 INJECTION, SOLUTION INTRAMUSCULAR; INTRAVENOUS at 10:04

## 2023-04-08 RX ADMIN — ASPIRIN 325 MG: 325 TABLET ORAL at 02:04

## 2023-04-08 RX ADMIN — THIORIDAZINE HYDROCHLORIDE 150 MG: 25 TABLET, FILM COATED ORAL at 08:04

## 2023-04-08 RX ADMIN — FENTANYL CITRATE 25 MCG: 50 INJECTION, SOLUTION INTRAMUSCULAR; INTRAVENOUS at 02:04

## 2023-04-08 RX ADMIN — EPHEDRINE SULFATE 20 MG: 50 INJECTION, SOLUTION INTRAMUSCULAR; INTRAVENOUS; SUBCUTANEOUS at 01:04

## 2023-04-08 RX ADMIN — DEXAMETHASONE SODIUM PHOSPHATE 8 MG: 4 INJECTION, SOLUTION INTRA-ARTICULAR; INTRALESIONAL; INTRAMUSCULAR; INTRAVENOUS; SOFT TISSUE at 01:04

## 2023-04-08 RX ADMIN — HYDROCORTISONE SODIUM SUCCINATE 125 MG: 250 INJECTION, POWDER, FOR SOLUTION INTRAMUSCULAR; INTRAVENOUS at 02:04

## 2023-04-08 RX ADMIN — PROPOFOL 200 MG: 10 INJECTION, EMULSION INTRAVENOUS at 01:04

## 2023-04-08 RX ADMIN — FENTANYL CITRATE 50 MCG: 50 INJECTION, SOLUTION INTRAMUSCULAR; INTRAVENOUS at 01:04

## 2023-04-08 RX ADMIN — EPHEDRINE SULFATE 15 MG: 50 INJECTION, SOLUTION INTRAMUSCULAR; INTRAVENOUS; SUBCUTANEOUS at 01:04

## 2023-04-08 RX ADMIN — GABAPENTIN 600 MG: 300 CAPSULE ORAL at 08:04

## 2023-04-08 RX ADMIN — SODIUM CHLORIDE, SODIUM GLUCONATE, SODIUM ACETATE, POTASSIUM CHLORIDE, MAGNESIUM CHLORIDE, SODIUM PHOSPHATE, DIBASIC, AND POTASSIUM PHOSPHATE: .53; .5; .37; .037; .03; .012; .00082 INJECTION, SOLUTION INTRAVENOUS at 01:04

## 2023-04-08 RX ADMIN — ONDANSETRON 4 MG: 2 INJECTION INTRAMUSCULAR; INTRAVENOUS at 03:04

## 2023-04-08 RX ADMIN — MORPHINE SULFATE 6 MG: 2 INJECTION, SOLUTION INTRAMUSCULAR; INTRAVENOUS at 09:04

## 2023-04-08 RX ADMIN — DEXAMETHASONE SODIUM PHOSPHATE 4 MG: 4 INJECTION, SOLUTION INTRA-ARTICULAR; INTRALESIONAL; INTRAMUSCULAR; INTRAVENOUS; SOFT TISSUE at 01:04

## 2023-04-08 RX ADMIN — ONDANSETRON 8 MG: 4 TABLET, ORALLY DISINTEGRATING ORAL at 10:04

## 2023-04-08 NOTE — ANESTHESIA PREPROCEDURE EVALUATION
04/08/2023  Norris Frank is a 72 y.o., female.    Pre-op Assessment    I have reviewed the Patient Summary Reports.    I have reviewed the Nursing Notes. I have reviewed the NPO Status.   I have reviewed the Medications.     Review of Systems  Anesthesia Hx:  No problems with previous Anesthesia    Social:  Non-Smoker    Hematology/Oncology:         -- Anemia:   Cardiovascular:   ECG has been reviewed. Denies cardiac history    Pulmonary:  Pulmonary Normal    Renal/:  Renal/ Normal     Hepatic/GI:  Hepatic/GI Normal    Musculoskeletal:   Arthritis  Femur fxr   Endocrine:   Hypothyroidism [place]'s dz - on steroids daily   Psych:   anxiety          Physical Exam  General:  Well nourished      Airway/Jaw/Neck:  Airway Findings: Mouth Opening: Normal   Tongue: Normal   Mallampati: II TM Distance: Normal, at least 6 cm   Jaw/Neck Findings:  Neck ROM: Normal ROM       Dental:  Dental Findings: upper front caps     Chest/Lungs:  Chest/Lungs Findings: Normal Respiratory Rate, Clear to auscultation      Heart/Vascular:  Heart Findings: Rate: Normal  Rhythm: Regular Rhythm             Anesthesia Plan  Type of Anesthesia, risks & benefits discussed:  Anesthesia Type:  Gen Supraglottic Airway    Patient's Preference:   Plan Factors:          Intra-op Monitoring Plan: standard ASA monitors  Intra-op Monitoring Plan Comments:   Post Op Pain Control Plan: multimodal analgesia and IV/PO Opioids PRN  Post Op Pain Control Plan Comments:     Induction:   IV and Inhalation  Beta Blocker:  Patient is not currently on a Beta-Blocker (No further documentation required).       Informed Consent: Informed consent signed with the Patient and all parties understand the risks and agree with anesthesia plan.  All questions answered.  Anesthesia consent signed with patient.  ASA Score: 2   Emergent   Day of Surgery Review of  History & Physical:              Ready For Surgery From Anesthesia Perspective.           Physical Exam  General: Well nourished    Airway:  Mallampati: II   Mouth Opening: Normal  TM Distance: Normal, at least 6 cm  Tongue: Normal  Neck ROM: Normal ROM    Dental:  upper front caps    Chest/Lungs:  Normal Respiratory Rate, Clear to auscultation    Heart:  Rate: Normal  Rhythm: Regular Rhythm          Anesthesia Plan  Type of Anesthesia, risks & benefits discussed:    Anesthesia Type: Gen Supraglottic Airway  Intra-op Monitoring Plan: standard ASA monitors  Post Op Pain Control Plan: multimodal analgesia and IV/PO Opioids PRN  Induction:  IV and Inhalation  Informed Consent: Informed consent signed with the Patient and all parties understand the risks and agree with anesthesia plan.  All questions answered.   ASA Score: 2 Emergent    Ready For Surgery From Anesthesia Perspective.       .

## 2023-04-08 NOTE — SUBJECTIVE & OBJECTIVE
"Interval History: see "Hospital Course"    Review of Systems   Constitutional:  Negative for chills and fever.   Respiratory:  Negative for cough and shortness of breath.    Cardiovascular:  Negative for chest pain.   Gastrointestinal:  Negative for abdominal pain, constipation, diarrhea, nausea and vomiting.   Musculoskeletal:  Positive for arthralgias, gait problem and myalgias.   Skin:  Positive for wound.   Objective:     Vital Signs (Most Recent):  Temp: 97.9 °F (36.6 °C) (04/08/23 1122)  Pulse: 97 (04/08/23 1122)  Resp: 18 (04/08/23 1122)  BP: (!) 118/57 (04/08/23 1122)  SpO2: 97 % (04/08/23 1122)   Vital Signs (24h Range):  Temp:  [96.7 °F (35.9 °C)-98.3 °F (36.8 °C)] 97.9 °F (36.6 °C)  Pulse:  [69-97] 97  Resp:  [16-20] 18  SpO2:  [97 %-100 %] 97 %  BP: (118-175)/(57-96) 118/57     Weight: 60 kg (132 lb 4.4 oz)  Body mass index is 20.11 kg/m².    Intake/Output Summary (Last 24 hours) at 4/8/2023 1137  Last data filed at 4/8/2023 0520  Gross per 24 hour   Intake 405.07 ml   Output 250 ml   Net 155.07 ml      Physical Exam  Vitals and nursing note reviewed.   Constitutional:       General: She is not in acute distress.     Appearance: Normal appearance. She is not ill-appearing.   HENT:      Head: Normocephalic and atraumatic.      Right Ear: External ear normal.      Left Ear: External ear normal.      Nose: Nose normal.      Mouth/Throat:      Mouth: Mucous membranes are moist.      Pharynx: Oropharynx is clear.   Eyes:      General:         Right eye: No discharge.         Left eye: No discharge.      Extraocular Movements: Extraocular movements intact.      Conjunctiva/sclera: Conjunctivae normal.   Cardiovascular:      Rate and Rhythm: Normal rate and regular rhythm.      Pulses: Normal pulses.      Heart sounds: Normal heart sounds.   Pulmonary:      Effort: Pulmonary effort is normal. No respiratory distress.      Breath sounds: Normal breath sounds. No wheezing, rhonchi or rales.   Abdominal:      " General: Abdomen is flat. Bowel sounds are normal. There is no distension.      Palpations: Abdomen is soft.      Tenderness: There is no abdominal tenderness. There is no guarding.   Musculoskeletal:         General: Deformity and signs of injury present.      Cervical back: Normal range of motion and neck supple.      Comments: RLE shortened and externally rotated. RLE NVI & distal strength intact. Few skin tears RUE.   Skin:     General: Skin is warm and dry.   Neurological:      Mental Status: She is alert and oriented to person, place, and time.   Psychiatric:         Mood and Affect: Affect normal.         Behavior: Behavior normal.         Thought Content: Thought content normal.       Significant Labs: All pertinent labs within the past 24 hours have been reviewed.    Significant Imaging: I have reviewed all pertinent imaging results/findings within the past 24 hours.

## 2023-04-08 NOTE — NURSING
Nurses Note -- 4 Eyes      4/7/2023   7:32 PM      Skin assessed during: Admit      [] No Pressure Injuries Present    []Prevention Measures Documented      [x] Yes- Altered Skin Integrity Present or Discovered   [x] LDA Added if Not in Epic (Describe Wound)   [x] New Altered Skin Integrity was Present on Admit and Documented in LDA   [x] Wound Image Taken    Wound Care Consulted? Yes    Attending Nurse:  Carter Verdugo RN     Second RN/Staff Member:  MAKAYLA Alan LPN

## 2023-04-08 NOTE — PT/OT/SLP PROGRESS
Occupational Therapy      Patient Name:  Norris Frank   MRN:  [id number]    Patient not seen today secondary to Other (Comment) (Surgery today. OT to follow up.). Will follow-up.    4/8/2023

## 2023-04-08 NOTE — PLAN OF CARE
[facility]  Initial Discharge Assessment       Primary Care Provider: Kathy Xiong NP    Admission Diagnosis: Pre-op testing [Z01.818]  Closed right hip fracture, initial encounter [S72.001A]  Displaced intertrochanteric fracture of right femur, initial encounter for closed fracture [S72.141A]    Admission Date: 4/7/2023  Expected Discharge Date:     Discharge Barriers Identified: None    Payor: [employer] / Plan: [employer] ([employer]) / Product Type: Commercial /     Extended Emergency Contact Information  Primary Emergency Contact: Edi Chanel   [place]  Mobile Phone: [phone]  Relation: Spouse    Discharge Plan A: Home Health  Discharge Plan B: Rehab      [id number] [address]  [address] & [address]  [address] [address]  Phone: [phone] Fax: [phone]    SW met with patient and patient's spouse Edi Frank at bedside to complete discharge planning assessment.  Patient alert and oriented xs 4.  Patient verified all demographic information on facesheet is correct.  Patient verified PCP is NEENA Xiong.  Patient verified primary health insurance is [employer].  Patient with NO home health or DME.  Patient with NO POA or Living Will.  Patient not on dialysis or medication coumadin.  Patient with no 30 day admission.  Patient with no financial issues at this time.  Patient family will provide transportation upon discharge from facility.  Patient independent with ADLs, live with spouse, drives self.      Initial Assessment (most recent)       Adult Discharge Assessment - 04/08/23 1334          Discharge Assessment    Assessment Type Discharge Planning Assessment     Confirmed/corrected address, phone number and insurance Yes     Confirmed Demographics Correct on Facesheet     Source of Information family;patient     Communicated JAMES with patient/caregiver Date not  available/Unable to determine     People in Home spouse     Facility Arrived From: HOME     Do you expect to return to your current living situation? Yes     Do you have help at home or someone to help you manage your care at home? Yes     Who are your caregiver(s) and their phone number(s)? spouse     Prior to hospitilization cognitive status: Alert/Oriented     Current cognitive status: Alert/Oriented     Readmission within 30 days? No     Patient currently being followed by outpatient case management? No     Do you currently have service(s) that help you manage your care at home? No     Do you take prescription medications? Yes     Do you have prescription coverage? Yes     Do you have any problems affording any of your prescribed medications? No     Is the patient taking medications as prescribed? yes     Who is going to help you get home at discharge? spouse     How do you get to doctors appointments? car, drives self     Are you on dialysis? No     Do you take coumadin? No     Discharge Plan A Home Health     Discharge Plan B Rehab     DME Needed Upon Discharge  none     Discharge Plan discussed with: Patient;Spouse/sig other     Discharge Barriers Identified None

## 2023-04-08 NOTE — PROGRESS NOTES
"[facility] Hospital Medicine  Progress Note    Patient Name: Norris Frank  MRN: [id number]  Patient Class: IP- Inpatient   Admission Date: 4/7/2023  Length of Stay: 1 days  Attending Physician: Ruddy Bundy MD  Primary Care Provider: Kathy Xiong NP        Subjective:     Principal Problem:Displaced intertrochanteric fracture of right femur, initial encounter for closed fracture        HPI:  Norris Frank is a 72 year old female with a past medical history of adrenal insufficiency, ADD, anxiety, and chronic neck and back pain who presents with her  for right hip pain after a fall earlier today while playing tennis. She had extreme pain and difficulty walking and generally moving the RLE after the fall. Pain is slightly improved now after pain meds in ER. Found to have an acute displaced and impacted right intertrochanteric femoral fracture on xray imaging. ER MD spoke to ortho MD who agrees to consult and likely surgery tomorrow 4/8. Admitted to hospital medicine service for continued management.      Overview/Hospital Course:  Norris Frank is a 72 year old female with a past medical history of adrenal insufficiency, ADD, anxiety, and chronic neck and back pain who was admitted after a mechanical fall with acute displaced and impacted right intertrochanteric femoral fracture. Orthopedic Surgery has been consulted and plans to take the patient to the OR 4/8. She is NPO.      Interval History: see "Hospital Course"    Review of Systems   Constitutional:  Negative for chills and fever.   Respiratory:  Negative for cough and shortness of breath.    Cardiovascular:  Negative for chest pain.   Gastrointestinal:  Negative for abdominal pain, constipation, diarrhea, nausea and vomiting.   Musculoskeletal:  Positive for arthralgias, gait problem and myalgias.   Skin:  Positive for wound.   Objective:     Vital Signs (Most Recent):  Temp: 97.9 °F (36.6 °C) (04/08/23 " 1122)  Pulse: 97 (04/08/23 1122)  Resp: 18 (04/08/23 1122)  BP: (!) 118/57 (04/08/23 1122)  SpO2: 97 % (04/08/23 1122)   Vital Signs (24h Range):  Temp:  [96.7 °F (35.9 °C)-98.3 °F (36.8 °C)] 97.9 °F (36.6 °C)  Pulse:  [69-97] 97  Resp:  [16-20] 18  SpO2:  [97 %-100 %] 97 %  BP: (118-175)/(57-96) 118/57     Weight: 60 kg (132 lb 4.4 oz)  Body mass index is 20.11 kg/m².    Intake/Output Summary (Last 24 hours) at 4/8/2023 1137  Last data filed at 4/8/2023 0520  Gross per 24 hour   Intake 405.07 ml   Output 250 ml   Net 155.07 ml      Physical Exam  Vitals and nursing note reviewed.   Constitutional:       General: She is not in acute distress.     Appearance: Normal appearance. She is not ill-appearing.   HENT:      Head: Normocephalic and atraumatic.      Right Ear: External ear normal.      Left Ear: External ear normal.      Nose: Nose normal.      Mouth/Throat:      Mouth: Mucous membranes are moist.      Pharynx: Oropharynx is clear.   Eyes:      General:         Right eye: No discharge.         Left eye: No discharge.      Extraocular Movements: Extraocular movements intact.      Conjunctiva/sclera: Conjunctivae normal.   Cardiovascular:      Rate and Rhythm: Normal rate and regular rhythm.      Pulses: Normal pulses.      Heart sounds: Normal heart sounds.   Pulmonary:      Effort: Pulmonary effort is normal. No respiratory distress.      Breath sounds: Normal breath sounds. No wheezing, rhonchi or rales.   Abdominal:      General: Abdomen is flat. Bowel sounds are normal. There is no distension.      Palpations: Abdomen is soft.      Tenderness: There is no abdominal tenderness. There is no guarding.   Musculoskeletal:         General: Deformity and signs of injury present.      Cervical back: Normal range of motion and neck supple.      Comments: RLE shortened and externally rotated. RLE NVI & distal strength intact. Few skin tears RUE.   Skin:     General: Skin is warm and dry.   Neurological:      Mental  Status: She is alert and oriented to person, place, and time.   Psychiatric:         Mood and Affect: Affect normal.         Behavior: Behavior normal.         Thought Content: Thought content normal.       Significant Labs: All pertinent labs within the past 24 hours have been reviewed.    Significant Imaging: I have reviewed all pertinent imaging results/findings within the past 24 hours.      Assessment/Plan:      * Displaced intertrochanteric fracture of right femur, initial encounter for closed fracture  -NPO  -Neurovascular checks Q4H  -Fall precautions  -Orthopedic Surgery consulted  -PRN analgesics    Adrenal insufficiency  Chronic.  -Continue home Cortef    Chronic neck and back pain  -Continue home gabapentin  -PRN analgesics    Anxiety  Chronic.  -Continue home medications    Attention deficit disorder (ADD) without hyperactivity  Chronic.  -Continue home Adderall; patient to bring medication from home      VTE Risk Mitigation (From admission, onward)         Ordered     enoxaparin injection 40 mg  Daily         04/08/23 1131     IP VTE HIGH RISK PATIENT  Once         04/07/23 1627     Place sequential compression device  Until discontinued         04/07/23 1627                Discharge Planning   JAMES:     Code Status: Full Code   Is the patient medically ready for discharge?:     Reason for patient still in hospital (select all that apply): Patient trending condition, Treatment, Consult recommendations, PT / OT recommendations and Pending disposition                     Ruddy Bundy MD  Department of Hospital Medicine   [facility]

## 2023-04-08 NOTE — TRANSFER OF CARE
"Anesthesia Transfer of Care Note    Patient: Norris Frank    Procedure(s) Performed: Procedure(s) (LRB):  INSERTION, INTRAMEDULLARY HALEY, FEMUR, Synthes, hana table, 1st assist (Right)    Patient location: PACU    Anesthesia Type: general    Transport from OR: Transported from OR on room air with adequate spontaneous ventilation    Post pain: adequate analgesia    Post assessment: no apparent anesthetic complications    Post vital signs: stable    Level of consciousness: responds to stimulation    Nausea/Vomiting: no nausea/vomiting    Complications: none    Transfer of care protocol was followed      Last vitals:   Visit Vitals  BP (!) 118/57   Pulse 97   Temp 36.6 °C (97.9 °F)   Resp 18   Ht 5' 8" (1.727 m)   Wt 60 kg (132 lb 4.4 oz)   SpO2 97%   Breastfeeding No   BMI 20.11 kg/m²     "

## 2023-04-08 NOTE — ASSESSMENT & PLAN NOTE
-NPO  -Neurovascular checks Q4H  -Fall precautions  -Orthopedic Surgery consulted  -PRN analgesics

## 2023-04-08 NOTE — ANESTHESIA POSTPROCEDURE EVALUATION
Anesthesia Post Evaluation    Patient: Norris Frank    Procedure(s) Performed: Procedure(s) (LRB):  INSERTION, INTRAMEDULLARY HALEY, FEMUR, Synthes, hana table, 1st assist (Right)    Final Anesthesia Type: general      Patient location during evaluation: PACU  Patient participation: Yes- Able to Participate  Level of consciousness: sedated and awake  Post-procedure vital signs: reviewed and stable  Pain management: adequate  Airway patency: patent    PONV status at discharge: No PONV  Anesthetic complications: no    Donna-operative Events Comments: Intraoperative hypotension treated with steroids, phenylephrine, ephedrine and epinephrine/ volume.   Cardiovascular status: blood pressure returned to baseline and hemodynamically stable  Respiratory status: spontaneous ventilation and oral airway  Hydration status: euvolemic  Follow-up not needed.          Vitals Value Taken Time   /57 04/08/23 1503   Temp 36.6 °C (97.9 °F) 04/08/23 1503   Pulse 81 04/08/23 1503   Resp 15 04/08/23 1503   SpO2 96 % 04/08/23 1503         Event Time   Out of Recovery 04/08/2023 14:55:00         Pain/Ramses Score: Pain Rating Prior to Med Admin: 6 (4/8/2023  2:50 PM)  Pain Rating Post Med Admin: 0 (4/8/2023  2:53 PM)  Ramses Score: 10 (4/8/2023  2:35 PM)

## 2023-04-08 NOTE — OP NOTE
Orthopedic Surgery Operative Report      DATE OF PROCEDURE: 04/08/2023   PREOPERATIVE DIAGNOSIS: Closed right hip fracture, initial encounter [S72.001A]  POSTOPERATIVE DIAGNOSIS: Same.  PROCEDURE PERFORMED: Intramedullary nailing, right basicervical femur fracture.  SURGEON: Surgeon(s) and Role:     * Primitivo Scott MD - Primary   FIRST ASSISTANT Shannan Griggs  ESTIMATED BLOOD LOSS: 200 cc.  ANESTHESIA: General endotracheal.  IMPLANTS: Synthes trochanteric fixation nail [id number] size with 90 mm helical blade.    INDICATIONS: The patient is an 72 y.o. female who had a low energy fall sustaining a right basicervical femur fracture. The patient presented to the Emergency Department and was evaluated by Orthopaedics and Internal Medicine. This procedure, as well as, alternatives to this procedure was discussed at length with the patient. Risks and benefits were also discussed. Risks include but are not limited to bleeding, infection, numbness, scarring, damage to major neurovascular structures, limb length/rotation discrepancy, failure of hardware, need for further surgery, loss of function, myocardial infarction, deep venous thrombosis, pulmonary embolism, nonunion, malunion and death. Patient understood these well and consented for the procedure as described..    DESCRIPTION OF PROCEDURE: The patient was identified in the preoperative holding area and site was marked. The patient was wheeled into the Operating Room and general endotracheal anesthesia was induced in the patient's hospital bed.  A timeout was taken to confirm the patient, site, surgery, surgeon and administration of preoperative antibiotics. All agreed and we proceeded. The patient was placed in fracture boots, transferred to the fracture table and manipulated under fluoroscopic control until we could obtain near anatomic alignment. The operative site was prepped and draped in the usual sterile fashion. A 3 cm longitudinal incision was made  over the lateral aspect of the hip just proximal and posterior to the greater trochanter. The gluteal fascia was incised and a hematoma was evacuated. A threaded guide pin was placed on the tip of the greater trochanter and entered into the proximal femur. This was checked under AP and lateral fluoroscopic views. A drill was used to breach the cortex and a guide pin was placed down the femoral canal distally to the physeal scar of the distal femur. A reamer was then used in a increasing incremental fashion to ream the femoral canal.  A 400mm nail was then placed without difficulty. The interlocking guide was then placed, and a 3 cm longitudinal incision was made over the lateral aspect of the thigh.  Subcutaneous tissue and fascia magno were incised.  The guide pin was then placed in the interlocking hole for the proximal helical blade, and the guide pin was placed into the center/center position of the femoral neck and head as verified on AP and lateral fluoroscopic views.  This was measured at 90mm. The step drill was used to ream the lateral cortex of the femur, and the helical blade was placed. Attention was then given to the distal nail, and using the perfect circles technique, a drill bit was placed through the intramedullary nail and bilateral cortices.  A proper size and fit of the distal screw was also noted and placed. On fluoroscopic control, placement was confirmed in AP and lateral fluoroscopic views. A near anatomical alignment of the fracture site was completed and all hardware was properly fixed. All wounds were thoroughly irrigated and hemostasis confirmed. The fascial layers were then reapproximated using #1 Vicryl suture in a figure-of-eight fashion. The subcutaneous tissues were reapproximated in layers using 3-0 Vicryl sutures, and the skin was reapproximated with 3-0 Nylon in a horizontal mattress suture fashion. Sterile dressings were applied. All instrument and sponge counts were reported  correct at the end of the case. There were no complications. The patient was extubated, awakened and taken to the post anesthesia care unit in stable condition.     PLAN FOR THE PATIENT: Early mobilization of the operative limb.      Primitivo Scott M.D.   [facility]

## 2023-04-08 NOTE — PLAN OF CARE
Plan of care reviewed with patient. Patient verbalized complete understanding. R. Hip arthroplasty today. Afebrile and antibiotics administered as scheduled. SCD's in place and anticoagulants administered as scheduled. PT/OT eval pending tomorrow with WBAT status ordered. All fall precautions maintained. Bed in lowest position, locked, call light within reach. Side rails up x's 2. Slip resistant socks maintained.

## 2023-04-08 NOTE — ANESTHESIA PROCEDURE NOTES
Intubation    Date/Time: 4/8/2023 1:14 PM  Performed by: Jose Stiles CRNA  Authorized by: Thierry Mosqueda MD     Intubation:     Induction:  Intravenous    Intubated:  Postinduction    Mask Ventilation:  Not attempted    Attempts:  1    Attempted By:  CRNA    Difficult Airway Encountered?: No      Complications:  None    Airway Device:  Supraglottic airway/LMA    Airway Device Size:  4.0    Secured at:  The lips    Placement Verified By:  Capnometry    Complicating Factors:  None    Findings Post-Intubation:  BS equal bilateral and atraumatic/condition of teeth unchanged

## 2023-04-08 NOTE — CONSULTS
Orthopedic Surgery Consult    History of present illness:   Patient presents with right hip pain s/p fall from standing height.  Experienced immediate inability to bear weight on the extremity.  Transported to the ED where workup demonstrated a right hip fracture.  Admitted for medical optimization and orthopedic intervention.  This is a closed injury.  Patient's pre-injury ambulatory status was independent.      Allergies:   Review of patient's allergies indicates:   Allergen Reactions    Livalo [pitavastatin calcium]     Percocet [oxycodone-acetaminophen]          Past medical history:   Past Medical History:   Diagnosis Date    ADHD (attention deficit hyperactivity disorder)     Hypothyroidism          Past surgical history:  Past Surgical History:   Procedure Laterality Date    BONE MARROW BIOPSY N/A 2018    Procedure: Biopsy-bone marrow;  Surgeon: Antonio Desouza MD;  Location: [facility];  Service: Interventional Radiology;  Laterality: N/A;     SECTION      CHOLECYSTECTOMY      FACIAL RECONSTRUCTION SURGERY           Social history:   Reviewed per EPIC history for tobacco or alcohol use       Medications:    Current Facility-Administered Medications:     acetaminophen tablet 1,000 mg, 1,000 mg, Oral, Q6H PRN, Francisco Javier Wolfe MD    ALPRAZolam tablet 2 mg, 2 mg, Oral, BID PRN, Carlotta Mayo NP    aluminum-magnesium hydroxide-simethicone 200-200-20 mg/5 mL suspension 30 mL, 30 mL, Oral, QID PRN, Francisco Javier Wolfe MD    cevimeline capsule 30 mg, 30 mg, Oral, Daily, Carlotta Mayo NP    dextrose 10% bolus 125 mL 125 mL, 12.5 g, Intravenous, PRN, Francisco Javier Wolfe MD    dextrose 10% bolus 250 mL 250 mL, 25 g, Intravenous, PRN, Francisco Javier Wolfe MD    dextrose 40 % gel 15,000 mg, 15 g, Oral, PRN, Francisco Javier Wolfe MD    dextrose 40 % gel 30,000 mg, 30 g, Oral, PRN, Francisco Javier Wolfe MD    gabapentin capsule 600 mg, 600 mg, Oral, QHS, Carlotta Mayo NP    glucagon (human recombinant) injection 1 mg, 1 mg,  Intramuscular, PRN, Francisco Javier Wolfe MD    hydrocortisone tablet 5 mg, 5 mg, Oral, Daily, Carlotta Mayo NP    magnesium oxide tablet 800 mg, 800 mg, Oral, PRN, Francisco Javier Wolfe MD    magnesium oxide tablet 800 mg, 800 mg, Oral, PRN, Francisco Javier Wolfe MD    melatonin tablet 6 mg, 6 mg, Oral, Nightly PRN, Francisco Javier Wolfe MD    morphine injection 6 mg, 6 mg, Intravenous, Q4H PRN, Francisco Javier Wolfe MD, 6 mg at 04/07/23 2213    multivitamin tablet, 1 tablet, Oral, Daily, Carlotta Mayo NP    naloxone 0.4 mg/mL injection 0.02 mg, 0.02 mg, Intravenous, PRN, Francisco Javier Wolfe MD    ondansetron disintegrating tablet 8 mg, 8 mg, Oral, Q8H PRN, Francisco Javier Wolfe MD, 8 mg at 04/07/23 2319    potassium bicarbonate disintegrating tablet 35 mEq, 35 mEq, Oral, PRN, Francisco Javier Wolfe MD    potassium bicarbonate disintegrating tablet 50 mEq, 50 mEq, Oral, PRN, Francisco Javier Wolfe MD    potassium bicarbonate disintegrating tablet 60 mEq, 60 mEq, Oral, PRN, Francisco Javier Wolfe MD    potassium, sodium phosphates 280-160-250 mg packet 2 packet, 2 packet, Oral, PRN, Francisco Javier Wolfe MD    potassium, sodium phosphates 280-160-250 mg packet 2 packet, 2 packet, Oral, PRN, Francisco Javier Wolfe MD    potassium, sodium phosphates 280-160-250 mg packet 2 packet, 2 packet, Oral, PRN, Francisco Javier Wolfe MD    sodium chloride 0.9% flush 10 mL, 10 mL, Intravenous, Q12H PRN, Francisco Javier Wolfe MD    thioridazine tablet 150 mg, 150 mg, Oral, QHS, Carlotta Mayo NP    traMADoL tablet 100 mg, 100 mg, Oral, Q4H PRN, Francisco Javier Wolfe MD        Physical Exam:   Vitals:    04/07/23 2213 04/07/23 2319 04/08/23 0401 04/08/23 0759   BP:  (!) 154/80 (!) 164/77 (!) 171/77   BP Location:  Right arm Right arm    Patient Position:  Lying Lying    Pulse:  72 69 89   Resp: 18 17 17 18   Temp:  96.7 °F (35.9 °C) 97.6 °F (36.4 °C) 98.3 °F (36.8 °C)   TempSrc:  Oral Oral    SpO2:  100% 100% 99%   Weight:       Height:         Recent Labs   Lab 04/08/23  0532   CALCIUM 9.0   PROT 6.7      K 3.7   CO2 27       BUN 8   CREATININE 0.7     Recent Labs   Lab 04/08/23  0532   WBC 8.73   RBC 4.32   HGB 13.7   HCT 39.7        No results for input(s): PT, INR, APTT in the last 72 hours.      Awake/alert/oriented x3, No acute distress, Afebrile, Vital signs stable  Normocephalic, Atraumatic  Heart is beating at normal rate  Good inspiratory effort with unlaboured breathing  Abdomen soft/nondistended/nontender    right lower extremity  Resting position of the limb is shortened and externally rotated compared to contralateral  Motor intact L2-S1  Sensation intact L2-S1  2+ PT/DP pulses  Skin intact      Imaging:  Radiographs of the Right femur demonstrate displaced intertrochanteric fracture      Assessment:   72 y.o. female with RIGHT hip fracture      Plan:   Will assume orthopedic care  Awaiting medical optimization/clearance for surgery  Will proceed to the OR for RIGHT femur cephalomedullary nailing  Provisionally scheduled for 1pm today  Pain control per primary team  Bed rest    Primitivo Scott MD  [facility]

## 2023-04-09 LAB
ALBUMIN SERPL BCP-MCNC: 2.9 G/DL (ref 3.5–5.2)
ALP SERPL-CCNC: 44 U/L (ref 55–135)
ALT SERPL W/O P-5'-P-CCNC: 13 U/L (ref 10–44)
ANION GAP SERPL CALC-SCNC: 9 MMOL/L (ref 8–16)
AST SERPL-CCNC: 18 U/L (ref 10–40)
BASOPHILS # BLD AUTO: 0.02 K/UL (ref 0–0.2)
BASOPHILS NFR BLD: 0.1 % (ref 0–1.9)
BILIRUB SERPL-MCNC: 0.5 MG/DL (ref 0.1–1)
BUN SERPL-MCNC: 13 MG/DL (ref 8–23)
CALCIUM SERPL-MCNC: 8 MG/DL (ref 8.7–10.5)
CHLORIDE SERPL-SCNC: 105 MMOL/L (ref 95–110)
CO2 SERPL-SCNC: 23 MMOL/L (ref 23–29)
CREAT SERPL-MCNC: 0.8 MG/DL (ref 0.5–1.4)
DIFFERENTIAL METHOD: ABNORMAL
EOSINOPHIL # BLD AUTO: 0 K/UL (ref 0–0.5)
EOSINOPHIL NFR BLD: 0 % (ref 0–8)
ERYTHROCYTE [DISTWIDTH] IN BLOOD BY AUTOMATED COUNT: 13.9 % (ref 11.5–14.5)
EST. GFR  (NO RACE VARIABLE): >60 ML/MIN/1.73 M^2
GLUCOSE SERPL-MCNC: 149 MG/DL (ref 70–110)
HCT VFR BLD AUTO: 28.8 % (ref 37–48.5)
HGB BLD-MCNC: 9.8 G/DL (ref 12–16)
IMM GRANULOCYTES # BLD AUTO: 0.07 K/UL (ref 0–0.04)
IMM GRANULOCYTES NFR BLD AUTO: 0.5 % (ref 0–0.5)
LYMPHOCYTES # BLD AUTO: 0.4 K/UL (ref 1–4.8)
LYMPHOCYTES NFR BLD: 3.1 % (ref 18–48)
MAGNESIUM SERPL-MCNC: 1.9 MG/DL (ref 1.6–2.6)
MCH RBC QN AUTO: 31.6 PG (ref 27–31)
MCHC RBC AUTO-ENTMCNC: 34 G/DL (ref 32–36)
MCV RBC AUTO: 93 FL (ref 82–98)
MONOCYTES # BLD AUTO: 0.6 K/UL (ref 0.3–1)
MONOCYTES NFR BLD: 4.4 % (ref 4–15)
NEUTROPHILS # BLD AUTO: 12.8 K/UL (ref 1.8–7.7)
NEUTROPHILS NFR BLD: 91.9 % (ref 38–73)
NRBC BLD-RTO: 0 /100 WBC
PHOSPHATE SERPL-MCNC: 3.6 MG/DL (ref 2.7–4.5)
PLATELET # BLD AUTO: 143 K/UL (ref 150–450)
PMV BLD AUTO: 11.4 FL (ref 9.2–12.9)
POTASSIUM SERPL-SCNC: 4 MMOL/L (ref 3.5–5.1)
PROT SERPL-MCNC: 5.3 G/DL (ref 6–8.4)
RBC # BLD AUTO: 3.1 M/UL (ref 4–5.4)
SODIUM SERPL-SCNC: 137 MMOL/L (ref 136–145)
WBC # BLD AUTO: 13.98 K/UL (ref 3.9–12.7)

## 2023-04-09 PROCEDURE — 94799 UNLISTED PULMONARY SVC/PX: CPT

## 2023-04-09 PROCEDURE — 83735 ASSAY OF MAGNESIUM: CPT | Performed by: ORTHOPAEDIC SURGERY

## 2023-04-09 PROCEDURE — 12000002 HC ACUTE/MED SURGE SEMI-PRIVATE ROOM

## 2023-04-09 PROCEDURE — 25000003 PHARM REV CODE 250: Performed by: ORTHOPAEDIC SURGERY

## 2023-04-09 PROCEDURE — 25000003 PHARM REV CODE 250: Performed by: STUDENT IN AN ORGANIZED HEALTH CARE EDUCATION/TRAINING PROGRAM

## 2023-04-09 PROCEDURE — 94761 N-INVAS EAR/PLS OXIMETRY MLT: CPT

## 2023-04-09 PROCEDURE — 85025 COMPLETE CBC W/AUTO DIFF WBC: CPT | Performed by: ORTHOPAEDIC SURGERY

## 2023-04-09 PROCEDURE — 36415 COLL VENOUS BLD VENIPUNCTURE: CPT | Performed by: ORTHOPAEDIC SURGERY

## 2023-04-09 PROCEDURE — 63600175 PHARM REV CODE 636 W HCPCS: Performed by: ORTHOPAEDIC SURGERY

## 2023-04-09 PROCEDURE — 97116 GAIT TRAINING THERAPY: CPT

## 2023-04-09 PROCEDURE — 84100 ASSAY OF PHOSPHORUS: CPT | Performed by: ORTHOPAEDIC SURGERY

## 2023-04-09 PROCEDURE — 99900035 HC TECH TIME PER 15 MIN (STAT)

## 2023-04-09 PROCEDURE — 80053 COMPREHEN METABOLIC PANEL: CPT | Performed by: ORTHOPAEDIC SURGERY

## 2023-04-09 RX ORDER — HYDROCODONE BITARTRATE AND ACETAMINOPHEN 10; 325 MG/1; MG/1
1 TABLET ORAL EVERY 6 HOURS PRN
Status: DISCONTINUED | OUTPATIENT
Start: 2023-04-09 | End: 2023-04-09

## 2023-04-09 RX ORDER — OXYCODONE AND ACETAMINOPHEN 5; 325 MG/1; MG/1
1 TABLET ORAL EVERY 4 HOURS PRN
Status: DISCONTINUED | OUTPATIENT
Start: 2023-04-09 | End: 2023-04-10 | Stop reason: HOSPADM

## 2023-04-09 RX ORDER — MUPIROCIN 20 MG/G
OINTMENT TOPICAL 2 TIMES DAILY
Status: DISCONTINUED | OUTPATIENT
Start: 2023-04-09 | End: 2023-04-10 | Stop reason: HOSPADM

## 2023-04-09 RX ADMIN — THIORIDAZINE HYDROCHLORIDE 100 MG: 25 TABLET, FILM COATED ORAL at 08:04

## 2023-04-09 RX ADMIN — CEFAZOLIN SODIUM 2 G: 2 SOLUTION INTRAVENOUS at 02:04

## 2023-04-09 RX ADMIN — CEFAZOLIN SODIUM 2 G: 2 SOLUTION INTRAVENOUS at 07:04

## 2023-04-09 RX ADMIN — GABAPENTIN 600 MG: 300 CAPSULE ORAL at 08:04

## 2023-04-09 RX ADMIN — MUPIROCIN: 20 OINTMENT TOPICAL at 11:04

## 2023-04-09 RX ADMIN — CEVIMELINE 30 MG: 30 CAPSULE ORAL at 08:04

## 2023-04-09 RX ADMIN — MUPIROCIN: 20 OINTMENT TOPICAL at 08:04

## 2023-04-09 RX ADMIN — THERA TABS 1 TABLET: TAB at 08:04

## 2023-04-09 RX ADMIN — ENOXAPARIN SODIUM 40 MG: 40 INJECTION SUBCUTANEOUS at 05:04

## 2023-04-09 RX ADMIN — HYDROCODONE BITARTRATE AND ACETAMINOPHEN 1 TABLET: 10; 325 TABLET ORAL at 11:04

## 2023-04-09 NOTE — CARE UPDATE
04/09/23 0807   Patient Assessment/Suction   Level of Consciousness (AVPU) alert   Respiratory Effort Normal;Unlabored   Expansion/Accessory Muscles/Retractions expansion symmetric;no retractions;no use of accessory muscles   All Lung Fields Breath Sounds Anterior:;Lateral:;diminished   Rhythm/Pattern, Respiratory depth regular;pattern regular;unlabored;no shortness of breath reported   Cough Frequency no cough   PRE-TX-O2   Device (Oxygen Therapy) room air   SpO2 98 %   Pulse Oximetry Type Intermittent   $ Pulse Oximetry - Multiple Charge Pulse Oximetry - Multiple   Pulse 83   Resp 18   Positioning HOB elevated 30 degrees   Incentive Spirometer   $ Incentive Spirometer Charges done independently per patient   Administration (IS) self-administered   Number of Repetitions (IS) 5   Level Incentive Spirometer (mL) 1000   Patient Tolerance (IS) good;no adverse signs/symptoms present

## 2023-04-09 NOTE — ASSESSMENT & PLAN NOTE
-PT/OT; WBAT RLE  -Neurovascular checks Q4H  -Fall precautions  -Orthopedic Surgery consulted; s/p IMN placement 4/8  -PRN analgesics

## 2023-04-09 NOTE — PT/OT/SLP EVAL
Physical Therapy Evaluation    Patient Name:  Norris Frank   MRN:  [id number]    Recommendations:     Discharge Recommendations: home with home health   Discharge Equipment Recommendations: walker, rolling, other (see comments) (possible bedsdie commode and/or shower chair)   Barriers to discharge: None    Assessment:     Norris Frank is a 72 y.o. female admitted with a medical diagnosis of Displaced intertrochanteric fracture of right femur, initial encounter for closed fracture.  She presents with the following impairments/functional limitations: weakness, impaired endurance, impaired self care skills, impaired functional mobility, gait instability, impaired balance, decreased lower extremity function, pain, orthopedic precautions. Pt is agreeable to participation in PT evaluation. Patient lives with spouse and is independent without AD at baseline. Patient performs supine to sit and step transfer with MOD A using RW, ambulated 10ft using RW with MOD A. Pt left up in chair with chair alarm on, all needs met, call button in reach, and RN notified.     Rehab Prognosis: Good; patient would benefit from acute skilled PT services to address these deficits and reach maximum level of function.    Recent Surgery: Procedure(s) (LRB):  INSERTION, INTRAMEDULLARY HALEY, FEMUR, Synthes, hana table, 1st assist (Right) 1 Day Post-Op    Plan:     During this hospitalization, patient to be seen BID to address the identified rehab impairments via gait training, therapeutic activities, therapeutic exercises and progress toward the following goals:    Plan of Care Expires:  05/09/23    Subjective     Chief Complaint: Right Hip Pain  Patient/Family Comments/goals: Maximize lower extremity function/ strength and independence prior to discharge  Pain/Comfort:  Pain Rating 1: 2/10  Location - Side 1: Right  Location 1: hip  Pain Addressed 1: Reposition    Patients cultural, spiritual, Yarsani conflicts given the current  situation:      Living Environment:  Pt lives with her spouse in a 2 story house with no steps to enter. She will have to sleep in her den on the first floor once she returns home. Her  is in good health and able to help with any needs.     Prior to admission, patients level of function was independent.  Equipment used at home: none.  DME owned (not currently used): none.  Upon discharge, patient will have assistance from her .    Objective:     Communicated with nurse Villegas prior to session.  Patient found HOB elevated with montgomery catheter, peripheral IV, SCD  upon PT entry to room.    General Precautions: Standard, fall  Orthopedic Precautions:RLE weight bearing as tolerated   Braces: N/A  Respiratory Status: Room air    Exams:  Cognitive Exam:  Patient is oriented to Person, Place, Time, and Situation  RUE ROM: WFL  RUE Strength: WFL  LUE ROM: WFL  LUE Strength: WFL  RLE ROM: Deficits: hip flexion, knee extension  RLE Strength: Deficits: grossly 3-/5  LLE ROM: WFL  LLE Strength: WFL    Functional Mobility:  Bed Mobility:     Rolling Left:  minimum assistance  Supine to Sit: minimum assistance  Transfers:     Sit to Stand:  moderate assistance with rolling walker  Bed to Chair: moderate assistance with  rolling walker  using  Step Transfer  Gait: x10ft using RW with MIN A and step to gait pattern      AM-PAC 6 CLICK MOBILITY  Total Score:15       Treatment & Education:    Gait x10ft using RW with MIN A and step through gait pattern   Seated therex:   Long arc quad  x20 each side       Patient left up in chair with all lines intact, call button in reach, and nurse Bakari notified.    GOALS:   Multidisciplinary Problems       Physical Therapy Goals          Problem: Physical Therapy    Goal Priority Disciplines Outcome Goal Variances Interventions   Physical Therapy Goal     PT, PT/OT Ongoing, Progressing                         History:     Past Medical History:   Diagnosis Date    ADHD (attention  deficit hyperactivity disorder)     Hypothyroidism        Past Surgical History:   Procedure Laterality Date    BONE MARROW BIOPSY N/A 2018    Procedure: Biopsy-bone marrow;  Surgeon: Antonio Desouza MD;  Location: [facility];  Service: Interventional Radiology;  Laterality: N/A;     SECTION      CHOLECYSTECTOMY      FACIAL RECONSTRUCTION SURGERY         Time Tracking:     PT Received On: 23  PT Start Time: 904     PT Stop Time: 955  PT Total Time (min): 51 min     Billable Minutes: Evaluation 43 and Gait Training 8      2023

## 2023-04-09 NOTE — PLAN OF CARE
Goals to be met by: 2023     Patient will increase functional independence with mobility by performin. Supine to sit with Modified [place]  2. Sit to supine with Modified [place]  3. Sit to stand transfer with Contact Guard Assistance  4. Gait  x 100 feet with Contact Guard Assistance using Rolling Walker.   5. Lower extremity exercise program x20 reps per handout, with independence

## 2023-04-09 NOTE — PROGRESS NOTES
"[facility] Hospital Medicine  Progress Note    Patient Name: Norris Frank  MRN: [id number]  Patient Class: IP- Inpatient   Admission Date: 4/7/2023  Length of Stay: 2 days  Attending Physician: Ruddy Bundy MD  Primary Care Provider: Kathy Xiong NP        Subjective:     Principal Problem:Displaced intertrochanteric fracture of right femur, initial encounter for closed fracture        HPI:  Norris Frank is a 72 year old female with a past medical history of adrenal insufficiency, ADD, anxiety, and chronic neck and back pain who presents with her  for right hip pain after a fall earlier today while playing tennis. She had extreme pain and difficulty walking and generally moving the RLE after the fall. Pain is slightly improved now after pain meds in ER. Found to have an acute displaced and impacted right intertrochanteric femoral fracture on xray imaging. ER MD spoke to ortho MD who agrees to consult and likely surgery tomorrow 4/8. Admitted to hospital medicine service for continued management.      Overview/Hospital Course:  Norris Frank is a 72 year old female with a past medical history of adrenal insufficiency, ADD, anxiety, and chronic neck and back pain who was admitted after a mechanical fall with acute displaced and impacted right intertrochanteric femoral fracture. Orthopedic Surgery was consulted and took the patient to the OR 4/8 for IMN placement without complication. PT/OT has been consulted.      Interval History: see "Hospital Course"    Review of Systems   Musculoskeletal:  Positive for arthralgias, joint swelling and myalgias.   Skin:  Positive for wound.   Objective:     Vital Signs (Most Recent):  Temp: 96.6 °F (35.9 °C) (04/09/23 0718)  Pulse: 83 (04/09/23 0807)  Resp: 18 (04/09/23 0807)  BP: (!) 96/55 (04/09/23 0718)  SpO2: 98 % (04/09/23 0807)   Vital Signs (24h Range):  Temp:  [96.6 °F (35.9 °C)-98.3 °F (36.8 °C)] 96.6 °F (35.9 °C)  Pulse:  " [] 83  Resp:  [14-30] 18  SpO2:  [94 %-100 %] 98 %  BP: ()/(54-70) 96/55     Weight: 60 kg (132 lb 4.4 oz)  Body mass index is 20.11 kg/m².    Intake/Output Summary (Last 24 hours) at 4/9/2023 1003  Last data filed at 4/9/2023 0816  Gross per 24 hour   Intake 2019.55 ml   Output 525 ml   Net 1494.55 ml      Physical Exam  Vitals and nursing note reviewed.   Constitutional:       General: She is not in acute distress.     Appearance: Normal appearance. She is not ill-appearing.   HENT:      Head: Normocephalic and atraumatic.      Right Ear: External ear normal.      Left Ear: External ear normal.      Nose: Nose normal.      Mouth/Throat:      Mouth: Mucous membranes are moist.      Pharynx: Oropharynx is clear.   Eyes:      General:         Right eye: No discharge.         Left eye: No discharge.      Extraocular Movements: Extraocular movements intact.      Conjunctiva/sclera: Conjunctivae normal.   Cardiovascular:      Rate and Rhythm: Normal rate and regular rhythm.      Pulses: Normal pulses.      Heart sounds: Normal heart sounds.   Pulmonary:      Effort: Pulmonary effort is normal. No respiratory distress.      Breath sounds: Normal breath sounds. No wheezing, rhonchi or rales.   Abdominal:      General: Abdomen is flat. Bowel sounds are normal. There is no distension.      Palpations: Abdomen is soft.      Tenderness: There is no abdominal tenderness. There is no guarding.   Musculoskeletal:      Cervical back: Normal range of motion and neck supple.      Right lower leg: No edema.      Left lower leg: No edema.      Comments: .   Skin:     General: Skin is warm and dry.      Comments: Dressing clean, dry and intact.   Neurological:      General: No focal deficit present.      Mental Status: She is alert and oriented to person, place, and time. Mental status is at baseline.      Motor: Weakness present.   Psychiatric:         Mood and Affect: Mood and affect normal.         Behavior: Behavior  normal.         Thought Content: Thought content normal.       Significant Labs: All pertinent labs within the past 24 hours have been reviewed.    Significant Imaging: I have reviewed all pertinent imaging results/findings within the past 24 hours.      Assessment/Plan:      * Displaced intertrochanteric fracture of right femur, initial encounter for closed fracture  -PT/OT; WBAT RLE  -Neurovascular checks Q4H  -Fall precautions  -Orthopedic Surgery consulted; s/p IMN placement 4/8  -PRN analgesics    Adrenal insufficiency  Chronic.  -Continue home Cortef    Chronic neck and back pain  -Continue home gabapentin  -PRN analgesics    Anxiety  Chronic.  -Continue home medications    Attention deficit disorder (ADD) without hyperactivity  Chronic.  -Continue home Adderall; patient to bring medication from home      VTE Risk Mitigation (From admission, onward)         Ordered     enoxaparin injection 40 mg  Daily         04/08/23 1131     IP VTE HIGH RISK PATIENT  Once         04/07/23 1627     Place sequential compression device  Until discontinued         04/07/23 1627                Discharge Planning   JAMES: 4/11/2023    Code Status: Full Code   Is the patient medically ready for discharge?:     Reason for patient still in hospital (select all that apply): Patient trending condition, PT / OT recommendations and Pending disposition  Discharge Plan A: Home Health                  Ruddy Bundy MD  Department of Hospital Medicine   [facility]

## 2023-04-09 NOTE — SUBJECTIVE & OBJECTIVE
"Interval History: see "Hospital Course"    Review of Systems   Musculoskeletal:  Positive for arthralgias, joint swelling and myalgias.   Skin:  Positive for wound.   Objective:     Vital Signs (Most Recent):  Temp: 96.6 °F (35.9 °C) (04/09/23 0718)  Pulse: 83 (04/09/23 0807)  Resp: 18 (04/09/23 0807)  BP: (!) 96/55 (04/09/23 0718)  SpO2: 98 % (04/09/23 0807)   Vital Signs (24h Range):  Temp:  [96.6 °F (35.9 °C)-98.3 °F (36.8 °C)] 96.6 °F (35.9 °C)  Pulse:  [] 83  Resp:  [14-30] 18  SpO2:  [94 %-100 %] 98 %  BP: ()/(54-70) 96/55     Weight: 60 kg (132 lb 4.4 oz)  Body mass index is 20.11 kg/m².    Intake/Output Summary (Last 24 hours) at 4/9/2023 1003  Last data filed at 4/9/2023 0816  Gross per 24 hour   Intake 2019.55 ml   Output 525 ml   Net 1494.55 ml      Physical Exam  Vitals and nursing note reviewed.   Constitutional:       General: She is not in acute distress.     Appearance: Normal appearance. She is not ill-appearing.   HENT:      Head: Normocephalic and atraumatic.      Right Ear: External ear normal.      Left Ear: External ear normal.      Nose: Nose normal.      Mouth/Throat:      Mouth: Mucous membranes are moist.      Pharynx: Oropharynx is clear.   Eyes:      General:         Right eye: No discharge.         Left eye: No discharge.      Extraocular Movements: Extraocular movements intact.      Conjunctiva/sclera: Conjunctivae normal.   Cardiovascular:      Rate and Rhythm: Normal rate and regular rhythm.      Pulses: Normal pulses.      Heart sounds: Normal heart sounds.   Pulmonary:      Effort: Pulmonary effort is normal. No respiratory distress.      Breath sounds: Normal breath sounds. No wheezing, rhonchi or rales.   Abdominal:      General: Abdomen is flat. Bowel sounds are normal. There is no distension.      Palpations: Abdomen is soft.      Tenderness: There is no abdominal tenderness. There is no guarding.   Musculoskeletal:      Cervical back: Normal range of motion and neck " supple.      Right lower leg: No edema.      Left lower leg: No edema.      Comments: .   Skin:     General: Skin is warm and dry.      Comments: Dressing clean, dry and intact.   Neurological:      General: No focal deficit present.      Mental Status: She is alert and oriented to person, place, and time. Mental status is at baseline.      Motor: Weakness present.   Psychiatric:         Mood and Affect: Mood and affect normal.         Behavior: Behavior normal.         Thought Content: Thought content normal.       Significant Labs: All pertinent labs within the past 24 hours have been reviewed.    Significant Imaging: I have reviewed all pertinent imaging results/findings within the past 24 hours.

## 2023-04-09 NOTE — PLAN OF CARE
Plan of care reviewed with patient. Patient verbalized complete understanding.  Afebrile and antibiotics administered as scheduled. SCD's in place and anticoagulants administered as scheduled. PT/OT eval completed. WBAT status maintained. All fall precautions maintained. Bed in lowest position, locked, call light within reach. Side rails up x's 2. Slip resistant socks maintained.

## 2023-04-10 VITALS
TEMPERATURE: 97 F | WEIGHT: 132.25 LBS | RESPIRATION RATE: 18 BRPM | DIASTOLIC BLOOD PRESSURE: 59 MMHG | HEIGHT: 68 IN | BODY MASS INDEX: 20.04 KG/M2 | SYSTOLIC BLOOD PRESSURE: 115 MMHG | OXYGEN SATURATION: 98 % | HEART RATE: 89 BPM

## 2023-04-10 LAB
ALBUMIN SERPL BCP-MCNC: 2.9 G/DL (ref 3.5–5.2)
ALP SERPL-CCNC: 41 U/L (ref 55–135)
ALT SERPL W/O P-5'-P-CCNC: 9 U/L (ref 10–44)
ANION GAP SERPL CALC-SCNC: 7 MMOL/L (ref 8–16)
AST SERPL-CCNC: 22 U/L (ref 10–40)
BASOPHILS # BLD AUTO: 0.05 K/UL (ref 0–0.2)
BASOPHILS NFR BLD: 0.5 % (ref 0–1.9)
BILIRUB SERPL-MCNC: 0.5 MG/DL (ref 0.1–1)
BUN SERPL-MCNC: 11 MG/DL (ref 8–23)
CALCIUM SERPL-MCNC: 8.5 MG/DL (ref 8.7–10.5)
CHLORIDE SERPL-SCNC: 106 MMOL/L (ref 95–110)
CO2 SERPL-SCNC: 27 MMOL/L (ref 23–29)
CREAT SERPL-MCNC: 0.7 MG/DL (ref 0.5–1.4)
DIFFERENTIAL METHOD: ABNORMAL
EOSINOPHIL # BLD AUTO: 0.1 K/UL (ref 0–0.5)
EOSINOPHIL NFR BLD: 0.5 % (ref 0–8)
ERYTHROCYTE [DISTWIDTH] IN BLOOD BY AUTOMATED COUNT: 14.2 % (ref 11.5–14.5)
EST. GFR  (NO RACE VARIABLE): >60 ML/MIN/1.73 M^2
GLUCOSE SERPL-MCNC: 100 MG/DL (ref 70–110)
HCT VFR BLD AUTO: 25.3 % (ref 37–48.5)
HGB BLD-MCNC: 8.6 G/DL (ref 12–16)
IMM GRANULOCYTES # BLD AUTO: 0.08 K/UL (ref 0–0.04)
IMM GRANULOCYTES NFR BLD AUTO: 0.7 % (ref 0–0.5)
LYMPHOCYTES # BLD AUTO: 1.5 K/UL (ref 1–4.8)
LYMPHOCYTES NFR BLD: 13.9 % (ref 18–48)
MAGNESIUM SERPL-MCNC: 1.9 MG/DL (ref 1.6–2.6)
MCH RBC QN AUTO: 31.9 PG (ref 27–31)
MCHC RBC AUTO-ENTMCNC: 34 G/DL (ref 32–36)
MCV RBC AUTO: 94 FL (ref 82–98)
MONOCYTES # BLD AUTO: 0.9 K/UL (ref 0.3–1)
MONOCYTES NFR BLD: 8.3 % (ref 4–15)
NEUTROPHILS # BLD AUTO: 8.1 K/UL (ref 1.8–7.7)
NEUTROPHILS NFR BLD: 76.1 % (ref 38–73)
NRBC BLD-RTO: 0 /100 WBC
PHOSPHATE SERPL-MCNC: 3.1 MG/DL (ref 2.7–4.5)
PLATELET # BLD AUTO: 145 K/UL (ref 150–450)
PMV BLD AUTO: 11.7 FL (ref 9.2–12.9)
POTASSIUM SERPL-SCNC: 3.9 MMOL/L (ref 3.5–5.1)
PROT SERPL-MCNC: 5.3 G/DL (ref 6–8.4)
RBC # BLD AUTO: 2.7 M/UL (ref 4–5.4)
SODIUM SERPL-SCNC: 140 MMOL/L (ref 136–145)
WBC # BLD AUTO: 10.7 K/UL (ref 3.9–12.7)

## 2023-04-10 PROCEDURE — 84100 ASSAY OF PHOSPHORUS: CPT | Performed by: ORTHOPAEDIC SURGERY

## 2023-04-10 PROCEDURE — 36415 COLL VENOUS BLD VENIPUNCTURE: CPT | Performed by: ORTHOPAEDIC SURGERY

## 2023-04-10 PROCEDURE — 25000003 PHARM REV CODE 250: Performed by: ORTHOPAEDIC SURGERY

## 2023-04-10 PROCEDURE — 80053 COMPREHEN METABOLIC PANEL: CPT | Performed by: ORTHOPAEDIC SURGERY

## 2023-04-10 PROCEDURE — 99900035 HC TECH TIME PER 15 MIN (STAT)

## 2023-04-10 PROCEDURE — 94761 N-INVAS EAR/PLS OXIMETRY MLT: CPT

## 2023-04-10 PROCEDURE — 25000003 PHARM REV CODE 250: Performed by: STUDENT IN AN ORGANIZED HEALTH CARE EDUCATION/TRAINING PROGRAM

## 2023-04-10 PROCEDURE — 83735 ASSAY OF MAGNESIUM: CPT | Performed by: ORTHOPAEDIC SURGERY

## 2023-04-10 PROCEDURE — 97165 OT EVAL LOW COMPLEX 30 MIN: CPT

## 2023-04-10 PROCEDURE — 85025 COMPLETE CBC W/AUTO DIFF WBC: CPT | Performed by: ORTHOPAEDIC SURGERY

## 2023-04-10 PROCEDURE — 97116 GAIT TRAINING THERAPY: CPT

## 2023-04-10 PROCEDURE — 97110 THERAPEUTIC EXERCISES: CPT

## 2023-04-10 PROCEDURE — 97535 SELF CARE MNGMENT TRAINING: CPT

## 2023-04-10 RX ORDER — NAPROXEN SODIUM 220 MG/1
81 TABLET, FILM COATED ORAL 2 TIMES DAILY
Status: DISCONTINUED | OUTPATIENT
Start: 2023-04-10 | End: 2023-04-10 | Stop reason: HOSPADM

## 2023-04-10 RX ORDER — MULTIVITAMIN
1 TABLET ORAL DAILY
Qty: 30 TABLET | Refills: 2 | Status: SHIPPED | OUTPATIENT
Start: 2023-04-10

## 2023-04-10 RX ORDER — NAPROXEN SODIUM 220 MG/1
81 TABLET, FILM COATED ORAL 2 TIMES DAILY
Qty: 58 TABLET | Refills: 0 | Status: SHIPPED | OUTPATIENT
Start: 2023-04-10 | End: 2023-04-10

## 2023-04-10 RX ORDER — OXYCODONE AND ACETAMINOPHEN 5; 325 MG/1; MG/1
1 TABLET ORAL EVERY 6 HOURS PRN
Qty: 20 TABLET | Refills: 0 | Status: SHIPPED | OUTPATIENT
Start: 2023-04-10

## 2023-04-10 RX ADMIN — OXYCODONE HYDROCHLORIDE AND ACETAMINOPHEN 1 TABLET: 5; 325 TABLET ORAL at 08:04

## 2023-04-10 RX ADMIN — MUPIROCIN: 20 OINTMENT TOPICAL at 09:04

## 2023-04-10 RX ADMIN — THERA TABS 1 TABLET: TAB at 08:04

## 2023-04-10 NOTE — PLAN OF CARE
Problem: Occupational Therapy  Goal: Occupational Therapy Goal  Description: Goals to be met by: 5/1/2023     Patient will increase functional independence with ADLs by performing:    LE Dressing with Modified [place].  Grooming while standing at sink with Modified [place].  Toileting from toilet with Modified [place] for hygiene and clothing management.   Supine to sit with Modified [place].  Toilet transfer to toilet with Modified [place].    Outcome: Ongoing, Progressing

## 2023-04-10 NOTE — PLAN OF CARE
SW met with patient at bedside regarding home health.  Patient signed patient choice disclosure choosing [facility] home [facility].  JOHN sent patient information to [facility] via [employer] system.  JOHN sent secure message to Nikolas COKER with [facility] regarding approval for delivery of rolling walker, bedside commode, and shower chair for patient.       04/10/23 1000   Post-Acute Status   Post-Acute Authorization HME;Home Health   HME Status Referrals Sent   Home Health Status Referrals Sent   Patient choice form signed by patient/caregiver List with quality metrics by geographic area provided

## 2023-04-10 NOTE — DISCHARGE SUMMARY
[facility] Hospital Medicine  Discharge Summary      Patient Name: Norris Frank  MRN: [id number]  ELA: [id number]  Patient Class: IP- Inpatient  Admission Date: 4/7/2023  Hospital Length of Stay: 3 days  Discharge Date and Time: No discharge date for patient encounter.  Attending Physician: Ruddy Bundy MD   Discharging Provider: Ruddy Bundy MD  Primary Care Provider: Kathy Xiong NP    Primary Care Team: Networked reference to record PCT     HPI:   Norris Frank is a 72 year old female with a past medical history of adrenal insufficiency, ADD, anxiety, and chronic neck and back pain who presents with her  for right hip pain after a fall earlier today while playing tennis. She had extreme pain and difficulty walking and generally moving the RLE after the fall. Pain is slightly improved now after pain meds in ER. Found to have an acute displaced and impacted right intertrochanteric femoral fracture on xray imaging. ER MD spoke to ortho MD who agrees to consult and likely surgery tomorrow 4/8. Admitted to hospital medicine service for continued management.      Procedure(s) (LRB):  INSERTION, INTRAMEDULLARY HALEY, FEMUR, Synthes, hana table, 1st assist (Right)      Hospital Course:   Norris Frank is a 72 year old female with a past medical history of adrenal insufficiency, ADD, anxiety, and chronic neck and back pain who was admitted after a mechanical fall with acute displaced and impacted right intertrochanteric femoral fracture. Orthopedic Surgery was consulted and took the patient to the OR 4/8 for IMN placement without complication. PT/OT was consulted and recommended  PT/OT which was arranged 4/10/2023 upon discharge. She will follow up with her PCP and with Orthopedic Surgery in the outpatient setting.       Goals of Care Treatment Preferences:  Code Status: Full Code      Consults:   Consults (From admission, onward)        Status Ordering Provider     IP  consult case management/social work  Once        Provider:  (Not yet assigned)    Completed PRIMITIVO SCOTT     Inpatient consult to Orthopedic Surgery  Once        Provider:  rPimitivo Scott MD    Completed CARLY PENA          Endocrine  Adrenal insufficiency  Chronic.  -Continue home Cortef    Orthopedic  Osteopenia  -Calcium and vitamin  D supplementation upon discharge      Chronic neck and back pain  -Continue home gabapentin  -PRN analgesics    Other  * Displaced intertrochanteric fracture of right femur, initial encounter for closed fracture  -PT/OT; WBAT RLE; HH PT/OT  -Neurovascular checks Q4H  -Fall precautions  -Orthopedic Surgery consulted; s/p IMN placement 4/8  -PRN analgesics  -ASA 81 BID for one month    Anxiety  Chronic.  -Continue home medications    Attention deficit disorder (ADD) without hyperactivity  Chronic.  -Continue home Adderall; patient to bring medication from home      Final Active Diagnoses:    Diagnosis Date Noted POA    PRINCIPAL PROBLEM:  Displaced intertrochanteric fracture of right femur, initial encounter for closed fracture [S72.141A] 04/07/2023 Yes    Osteopenia [M85.80] 04/08/2023 Yes    Attention deficit disorder (ADD) without hyperactivity [F98.8] 04/07/2023 Yes     Chronic    Anxiety [F41.9] 04/07/2023 Yes     Chronic    Chronic neck and back pain [M54.2, M54.9, G89.29] 04/07/2023 Yes     Chronic    Adrenal insufficiency [E27.40] 04/07/2023 Yes     Chronic      Problems Resolved During this Admission:       Discharged Condition: stable    Disposition: Home-Health Care [facility]    Follow Up:   Follow-up Information     Kathy Xiong NP Follow up in 2 week(s).    Specialty: Family Medicine  Contact information:  [place] [facility] [address] [phone]             Primitivo Scott MD Follow up in 2 week(s).    Specialty: Orthopedic Surgery  Contact information:  [address] [facility] [address]  [id number]  "ORTHOPEDICS [facility] SPORTS MEDICINE  [place] [address]  [phone]                       Patient Instructions:      WALKER FOR HOME USE     Order Specific Question Answer Comments   Type of Walker: Adult (5'4"-6'6")    With wheels? Yes    Height: 5' 8" (1.727 m)    Weight: 60 kg (132 lb 4.4 oz)    Length of need (1-99 months): 6    Does patient have medical equipment at home? none    Please check all that apply: Patient's condition impairs ambulation.    Please check all that apply: Patient is unable to safely ambulate without equipment.    Please check all that apply: Altered sensory perception.    Please check all that apply: Patient needs help to get in and out of chair.    Please check all that apply: Walker will be used for gait training.      COMMODE FOR HOME USE     Order Specific Question Answer Comments   Type: Standard    Height: 5' 8" (1.727 m)    Weight: 60 kg (132 lb 4.4 oz)    Does patient have medical equipment at home? none    Length of need (1-99 months): 6      BATH/SHOWER CHAIR FOR HOME USE     Order Specific Question Answer Comments   Height: 5' 8" (1.727 m)    Weight: 60 kg (132 lb 4.4 oz)    Does patient have medical equipment at home? none    Length of need (1-99 months): 6    Type: With back      Ambulatory referral/consult to Home Health   Standing Status: Future   Referral Priority: Routine Referral Type: Home Health   Referral Reason: Specialty Services Required   Requested Specialty: Home Health Services   Number of Visits Requested: 1     Diet Adult Regular     Notify your health care provider if you experience any of the following:  increased confusion or weakness     Notify your health care provider if you experience any of the following:  persistent dizziness, light-headedness, or visual disturbances     Notify your health care provider if you experience any of the following:  severe persistent headache     Notify your health care provider if you experience any of the following:  " difficulty breathing or increased cough     Notify your health care provider if you experience any of the following:  temperature >100.4     Notify your health care provider if you experience any of the following:  persistent nausea and vomiting or diarrhea     Notify your health care provider if you experience any of the following:  severe uncontrolled pain     Notify your health care provider if you experience any of the following:  redness, tenderness, or signs of infection (pain, swelling, redness, odor or green/yellow discharge around incision site)     Activity as tolerated       Significant Diagnostic Studies: Labs: All labs within the past 24 hours have been reviewed    Pending Diagnostic Studies:     None         Medications:  Reconciled Home Medications:      Medication List      START taking these medications    aspirin 81 MG Chew  Take 1 tablet (81 mg total) by mouth 2 (two) times a day.     calcium-vitamin D 600 mg-10 mcg (400 unit) Tab  Commonly known as: CALCIUM WITH VITAMIN D  Take 1 tablet by mouth once daily.     oxyCODONE-acetaminophen 5-325 mg per tablet  Commonly known as: PERCOCET  Take 1 tablet by mouth every 6 (six) hours as needed for Pain.        CHANGE how you take these medications    dextroamphetamine-amphetamine 20 mg tablet  Commonly known as: ADDERALL  Take 2 tablets by mouth 2 (two) times daily.  What changed:   · how much to take  · when to take this  · additional instructions        CONTINUE taking these medications    ALPRAZolam 2 MG Tab  Commonly known as: XANAX  Take 2 mg by mouth 2 (two) times daily as needed.     cevimeline 30 mg capsule  Commonly known as: EVOXAC  once daily.     gabapentin 300 MG capsule  Commonly known as: NEURONTIN  Take 600 mg by mouth every evening.     hydrocortisone 5 MG Tab  Commonly known as: CORTEF  5 mg 2 (two) times daily.     LORazepam 2 MG Tab  Commonly known as: ATIVAN  2 mg nightly as needed.     midodrine 2.5 MG Tab  Commonly known as:  PROAMATINE  Take 2.5 mg by mouth daily as needed.     multivitamin per tablet  Commonly known as: THERAGRAN  Take 1 tablet by mouth once daily.     sumatriptan 6 mg/0.5 mL kit  Commonly known as: IMITREX STATDOSE  daily as needed.     testosterone  Inject 175 mg into the muscle every 3 (three) months.     thioridazine 50 MG tablet  Commonly known as: MELLARIL  Take 3 tablets by mouth every evening.            Indwelling Lines/Drains at time of discharge:   Lines/Drains/Airways     None                 Time spent on the discharge of patient: 32 minutes         Ruddy Bundy MD  Department of Hospital Medicine  [facility]

## 2023-04-10 NOTE — CONSULTS
R elbow skin tears. Cleansed wounds with wound cleanser and gauze. Applied skin prep to all surrounding skin then applied urgotul to wound bed secured with a foam dressing.     Educated patient on home wound care to which she verbalized understanding.

## 2023-04-10 NOTE — CARE UPDATE
04/10/23 0743   Patient Assessment/Suction   Level of Consciousness (AVPU) alert   Respiratory Effort Normal;Unlabored   Expansion/Accessory Muscles/Retractions no use of accessory muscles;no retractions;expansion symmetric   PRE-TX-O2   Device (Oxygen Therapy) room air   SpO2 99 %   Pulse Oximetry Type Intermittent   $ Pulse Oximetry - Multiple Charge Pulse Oximetry - Multiple   Pulse 92   Resp 16   Incentive Spirometer   $ Incentive Spirometer Charges refused;other (see comments)  (patient states that she just woke up and requested that I come back later)

## 2023-04-10 NOTE — PLAN OF CARE
Patient cleared for discharge from case management standpoint.    PCP and Surgery office to contact patient to schedule hospital follow ups.       04/10/23 1203   Final Note   Assessment Type Final Discharge Note   Anticipated Discharge Disposition Home-Health   What phone number can be called within the next 1-3 days to see how you are doing after discharge? [phone]   Hospital Resources/Appts/Education Provided Post-Acute resouces added to AVS;Provided education on problems/symptoms using teachback;Appointments scheduled and added to AVS;Community resources provided;Provided patient/caregiver with written discharge plan information

## 2023-04-10 NOTE — PROGRESS NOTES
Pt d/c education provided, pt verbalized understanding. PIV removed. Discharge instructions and personal belongings in hand. All equipment taken down to vehicle by pt's spouse. Pt transported via wheelchair to spouses personal vehicle.

## 2023-04-10 NOTE — PT/OT/SLP PROGRESS
Physical Therapy      Patient Name:  Norris Frank   MRN:  [id number]    Patient not seen today secondary to Patient unwilling to participate. Will follow-up 4/10/23.

## 2023-04-10 NOTE — PT/OT/SLP PROGRESS
Physical Therapy Treatment    Patient Name:  Norris Frank   MRN:  [id number]    Recommendations:     Discharge Recommendations: home health PT  Discharge Equipment Recommendations: walker, rolling, other (see comments) (possible bedsdie commode and/or shower chair)  Barriers to discharge: None    Assessment:     Nroris Frank is a 72 y.o. female admitted with a medical diagnosis of Displaced intertrochanteric fracture of right femur, initial encounter for closed fracture.  She presents with the following impairments/functional limitations: weakness, impaired endurance, impaired functional mobility, gait instability, impaired balance, decreased lower extremity function, pain, decreased ROM, edema, orthopedic precautions .  Patient agreeable to PT treatment this morning.  Patient presented supine in bed and required min assist to transfer to sitting and then min assist to stand with a RW.  Patient then ambulated x 20 feet with RW with CGA with a slow step to gait pattern.    Rehab Prognosis: Good; patient would benefit from acute skilled PT services to address these deficits and reach maximum level of function.    Recent Surgery: Procedure(s) (LRB):  INSERTION, INTRAMEDULLARY HALEY, FEMUR, Synthes, hana table, 1st assist (Right) 2 Days Post-Op    Plan:     During this hospitalization, patient to be seen BID to address the identified rehab impairments via gait training, therapeutic activities, therapeutic exercises and progress toward the following goals:    Plan of Care Expires:  05/09/23    Subjective     Chief Complaint: pain  Patient/Family Comments/goals: walk better  Pain/Comfort:  Pain Rating 1: 5/10  Location - Side 1: Right  Location - Orientation 1: lower  Location 1: hip  Pain Addressed 1: Reposition, Cessation of Activity  Pain Rating Post-Intervention 1: 5/10      Objective:     Communicated with nurse prior to session.  Patient found supine with bed alarm upon PT entry to room.     General  Precautions: Standard, fall  Orthopedic Precautions: RLE weight bearing as tolerated  Braces: N/A  Respiratory Status: Room air     Functional Mobility:  Bed Mobility:     Supine to Sit: minimum assistance  Transfers:     Sit to Stand:  minimum assistance with rolling walker  Gait: x 20 feet RW CGA      AM-PAC 6 CLICK MOBILITY          Treatment & Education:  Exercise to include ankle pumps, quad sets, heel slides, hip abd and LAQ.  All done right LE x 10 reps.  Gait training x 20 feet rW CGA with a a slow step to gait pattern    Patient left up in chair with call button in reach, chair alarm on, and nurse notified..    GOALS:   Multidisciplinary Problems       Physical Therapy Goals          Problem: Physical Therapy    Goal Priority Disciplines Outcome Goal Variances Interventions   Physical Therapy Goal     PT, PT/OT Ongoing, Progressing                         Time Tracking:     PT Received On: 04/10/23  PT Start Time: 0821     PT Stop Time: 0852  PT Total Time (min): 31 min     Billable Minutes: Gait Training 16 and Therapeutic Exercise 15    Treatment Type: Treatment  PT/PTA: PT     Number of PTA visits since last PT visit: 0     04/10/2023

## 2023-04-10 NOTE — ASSESSMENT & PLAN NOTE
-PT/OT; WBAT RLE; HH PT/OT  -Neurovascular checks Q4H  -Fall precautions  -Orthopedic Surgery consulted; s/p IMN placement 4/8  -PRN analgesics  -ASA 81 BID for one month

## 2023-04-10 NOTE — PLAN OF CARE
Ok to pull rolling walker, bedside commode, and shower chair from [facility] DME closet per Nikolas COKER with [facility].  JOHN delivered listed equipment to patient's hospital room.  Patient signed delivery ticket.  Patient accepted by [facility] via careport with start of care date: 04/11/2023.    3:00pm patient declined shower chair stating she has one at home but would like hospital bed, short hip kit ordered.  JOHN delivered short hip kit to bedside and updated patient information on delivery ticket.  Secure message sent to Nikolas COKER to contact patient regarding payment for equipment.  Per Nikolas via secure message, he will contact patient.       04/10/23 1202   Post-Acute Status   Post-Acute Authorization Home Health;HME   HME Status Set-up Complete/Auth obtained   Home Health Status Set-up Complete/Auth obtained

## 2023-04-10 NOTE — PT/OT/SLP EVAL
Occupational Therapy   Evaluation    Name: Norris Frank  MRN: [id number]  Admitting Diagnosis: Displaced intertrochanteric fracture of right femur, initial encounter for closed fracture  Recent Surgery: Procedure(s) (LRB):  INSERTION, INTRAMEDULLARY HALEY, FEMUR, Synthes, hana table, 1st assist (Right) 2 Days Post-Op    Recommendations:     Discharge Recommendations: home health OT  Discharge Equipment Recommendations:   (Case mgmt delivered RW, BSC, and shower chair to room)  Barriers to discharge:  None    Assessment:     Norris Frank is a 72 y.o. female with a medical diagnosis of Displaced intertrochanteric fracture of right femur, initial encounter for closed fracture.  She presents with c/o decreased activity tolerance with ADL participation while standing at sink. Despite complaints, patient was still able to complete self care tasks at sink without requiring a sitting rest break. Patient performed transfers requiring only CGA using RW. Performance deficits affecting function: weakness, impaired endurance, impaired self care skills, impaired functional mobility, gait instability, impaired balance, decreased lower extremity function, decreased coordination, pain, decreased ROM, orthopedic precautions.      Rehab Prognosis: Good; patient would benefit from acute skilled OT services to address these deficits and reach maximum level of function.       Plan:     Patient to be seen 5 x/week to address the above listed problems via self-care/home management, therapeutic activities, therapeutic exercises  Plan of Care Expires: 05/01/23  Plan of Care Reviewed with: patient    Subjective     Chief Complaint: R hip pain  Patient/Family Comments/goals: none    Occupational Profile:  Living Environment: Patient lives with spouse in a 2SH. Patient will live on 1st floor.   Previous level of function: Patient was independent with ADLs and mobility.  Equipment Used at Home: none  Assistance upon Discharge: Patient will  receive assistance from spouse.     Pain/Comfort:  Pain Rating 1: 5/10  Location - Side 1: Right  Location - Orientation 1: generalized  Location 1: hip  Pain Addressed 1: Reposition, Distraction  Pain Rating Post-Intervention 1: 6/10    Patients cultural, spiritual, Scientology conflicts given the current situation: no    Objective:     Communicated with: nurse Colvin prior to session.  Patient found up in chair with   upon OT entry to room.    General Precautions: Standard, fall  Orthopedic Precautions: RLE weight bearing as tolerated  Braces: N/A  Respiratory Status: Room air    Occupational Performance:    Bed Mobility:    Not assessed; patient seated in chair upon arrival. See PT notes.     Functional Mobility/Transfers:  Patient completed Sit <> Stand Transfer with contact guard assistance  with  rolling walker   Patient completed Toilet Transfer Stand Pivot technique with contact guard assistance with  rolling walker  Functional Mobility: Patient ambulated in room from chair>bathroom>sink>chair again requiring CGA using RW.     Activities of Daily Living:  Grooming: stand by assistance with hand hygiene while standing at sink.   Lower Body Dressing: maximal assistance to don/doff socks while seated in chair     Cognitive/Visual Perceptual:  Cognitive/Psychosocial Skills:     -       Oriented to: x4   -       Follows Commands/attention:Follows multistep  commands  -       Communication: clear/fluent  -       Safety awareness/insight to disability: intact   -       Mood/Affect/Coping skills/emotional control: Appropriate to situation and Cooperative  Visual/Perceptual:      -Intact     Physical Exam:  Postural examination/scapula alignment:    -       Rounded shoulders  -       Forward head  Upper Extremity Range of Motion:     -       Right Upper Extremity: WFL  -       Left Upper Extremity: WFL  Upper Extremity Strength:    -       Right Upper Extremity: WFL  -       Left Upper Extremity: WFL   Strength:     -       Right Upper Extremity: WFL  -       Left Upper Extremity: WFL  Fine Motor Coordination:    -       Intact  Gross motor coordination:   WFL in BUE    AMPAC 6 Click ADL:  AMPAC Total Score: 19    Treatment & Education:  OT ed pt on OT role & POC as well as discharge recommendations.  OT ed patient on safety with walker use for functional mobility with cues for hand placement & sequencing.   OT ed pt on use of adaptive equipment for LB dressing & safe item retrieval with reacher with demonstration provided.      Patient left up in chair with all lines intact, call button in reach, and nurse notified    GOALS:   Multidisciplinary Problems       Occupational Therapy Goals          Problem: Occupational Therapy    Goal Priority Disciplines Outcome Interventions   Occupational Therapy Goal     OT, PT/OT Ongoing, Progressing    Description: Goals to be met by: 2023     Patient will increase functional independence with ADLs by performing:    LE Dressing with Modified [place].  Grooming while standing at sink with Modified [place].  Toileting from toilet with Modified [place] for hygiene and clothing management.   Supine to sit with Modified [place].  Toilet transfer to toilet with Modified [place].                         History:     Past Medical History:   Diagnosis Date    ADHD (attention deficit hyperactivity disorder)     Hypothyroidism          Past Surgical History:   Procedure Laterality Date    BONE MARROW BIOPSY N/A 2018    Procedure: Biopsy-bone marrow;  Surgeon: Antonio Desouza MD;  Location: [facility];  Service: Interventional Radiology;  Laterality: N/A;     SECTION      CHOLECYSTECTOMY      FACIAL RECONSTRUCTION SURGERY      INTRAMEDULLARY RODDING OF FEMUR Right 2023    Procedure: INSERTION, INTRAMEDULLARY HALEY, FEMUR, Synthes, hana table, 1st assist;  Surgeon: Primitivo Scott MD;  Location: [facility];  Service: Orthopedics;  Laterality: Right;        Time Tracking:     OT Date of Treatment: 04/10/23  OT Start Time: 0945  OT Stop Time: 1052  OT Total Time (min): 67 min    Billable Minutes:Evaluation 10  Self Care/Home Management 67    4/10/2023

## 2023-04-12 ENCOUNTER — PATIENT OUTREACH (OUTPATIENT)
Dept: ADMINISTRATIVE | Facility: CLINIC | Age: 73
End: 2023-04-12
Payer: COMMERCIAL

## 2023-04-12 NOTE — PROGRESS NOTES
C3 nurse attempted to contact Norris Frank  for a TCC post hospital discharge follow up call. No answer. The patient does not have a scheduled HOSFU appointment, and the pt does not have an [facility] PCP.

## 2023-04-13 ENCOUNTER — LAB VISIT (OUTPATIENT)
Dept: LAB | Facility: HOSPITAL | Age: 73
End: 2023-04-13
Attending: ORTHOPAEDIC SURGERY
Payer: COMMERCIAL

## 2023-04-13 DIAGNOSIS — M86.9 SAPHO SYNDROME: ICD-10-CM

## 2023-04-13 DIAGNOSIS — L40.3 SAPHO SYNDROME: ICD-10-CM

## 2023-04-13 DIAGNOSIS — M54.2 CERVICALGIA: Primary | ICD-10-CM

## 2023-04-13 DIAGNOSIS — G89.29 CHRONIC IDIOPATHIC ANAL PAIN: ICD-10-CM

## 2023-04-13 DIAGNOSIS — L70.9 SAPHO SYNDROME: ICD-10-CM

## 2023-04-13 DIAGNOSIS — M65.9 SAPHO SYNDROME: ICD-10-CM

## 2023-04-13 DIAGNOSIS — K62.89 CHRONIC IDIOPATHIC ANAL PAIN: ICD-10-CM

## 2023-04-13 DIAGNOSIS — M85.80 SAPHO SYNDROME: ICD-10-CM

## 2023-04-13 DIAGNOSIS — S72.141A CLOSED INTERTROCHANTERIC FRACTURE OF RIGHT FEMUR: ICD-10-CM

## 2023-04-13 LAB
BASOPHILS # BLD AUTO: 0.06 K/UL (ref 0–0.2)
BASOPHILS NFR BLD: 0.9 % (ref 0–1.9)
DIFFERENTIAL METHOD: ABNORMAL
EOSINOPHIL # BLD AUTO: 0.1 K/UL (ref 0–0.5)
EOSINOPHIL NFR BLD: 1.9 % (ref 0–8)
ERYTHROCYTE [DISTWIDTH] IN BLOOD BY AUTOMATED COUNT: 14.3 % (ref 11.5–14.5)
HCT VFR BLD AUTO: 30 % (ref 37–48.5)
HGB BLD-MCNC: 9.5 G/DL (ref 12–16)
IMM GRANULOCYTES # BLD AUTO: 0.08 K/UL (ref 0–0.04)
IMM GRANULOCYTES NFR BLD AUTO: 1.2 % (ref 0–0.5)
LYMPHOCYTES # BLD AUTO: 1.2 K/UL (ref 1–4.8)
LYMPHOCYTES NFR BLD: 17.6 % (ref 18–48)
MCH RBC QN AUTO: 30.5 PG (ref 27–31)
MCHC RBC AUTO-ENTMCNC: 31.7 G/DL (ref 32–36)
MCV RBC AUTO: 97 FL (ref 82–98)
MONOCYTES # BLD AUTO: 0.7 K/UL (ref 0.3–1)
MONOCYTES NFR BLD: 9.6 % (ref 4–15)
NEUTROPHILS # BLD AUTO: 4.7 K/UL (ref 1.8–7.7)
NEUTROPHILS NFR BLD: 68.8 % (ref 38–73)
NRBC BLD-RTO: 0 /100 WBC
PLATELET # BLD AUTO: 257 K/UL (ref 150–450)
PMV BLD AUTO: 11.6 FL (ref 9.2–12.9)
RBC # BLD AUTO: 3.11 M/UL (ref 4–5.4)
WBC # BLD AUTO: 6.88 K/UL (ref 3.9–12.7)

## 2023-04-13 PROCEDURE — 85025 COMPLETE CBC W/AUTO DIFF WBC: CPT | Performed by: ORTHOPAEDIC SURGERY

## 2023-04-13 NOTE — PROGRESS NOTES
2nd Attempt made to reach patient for TCC call. Left voicemail please call [phone] leave first name, last name, and  Dinorah will return your call.

## 2023-04-13 NOTE — PROGRESS NOTES
C3 nurse attempted to contact patient for a TCC post hospital discharge follow-up call. The patient declined call at this time.       Pt states HH provided a thorough review and does not feel any other review necessary. Provided pt with # to [facility].    Non-[facility] pcp, unable to route.

## 2023-10-09 DIAGNOSIS — Z13.820 SCREENING FOR OSTEOPOROSIS: ICD-10-CM

## 2023-10-09 DIAGNOSIS — M81.0 SENILE OSTEOPOROSIS: Primary | ICD-10-CM

## 2023-10-09 DIAGNOSIS — R29.6 FALLS FREQUENTLY: ICD-10-CM

## 2023-10-20 ENCOUNTER — HOSPITAL ENCOUNTER (OUTPATIENT)
Dept: RADIOLOGY | Facility: HOSPITAL | Age: 73
Discharge: HOME | End: 2023-10-20
Attending: NURSE PRACTITIONER
Payer: COMMERCIAL

## 2023-10-20 DIAGNOSIS — Z13.820 SCREENING FOR OSTEOPOROSIS: ICD-10-CM

## 2023-10-20 DIAGNOSIS — M81.0 SENILE OSTEOPOROSIS: ICD-10-CM

## 2023-10-20 DIAGNOSIS — R29.6 FALLS FREQUENTLY: ICD-10-CM

## 2023-10-20 PROCEDURE — 77080 DXA BONE DENSITY AXIAL: CPT | Mod: TC,PO

## 2025-09-03 DIAGNOSIS — Z87.310 PERSONAL HISTORY OF HEALED OSTEOPOROSIS FRACTURE: Primary | ICD-10-CM

## (undated) DEVICE — DRAPE STERI-DRAPE 1000 17X11IN

## (undated) DEVICE — GLOVE SURG ULTRA TOUCH 8

## (undated) DEVICE — GOWN POLY REINF BRTH SLV LG

## (undated) DEVICE — SPONGE BULKEE II ABSRB 6X6.75

## (undated) DEVICE — DRAPE THREE-QTR REINF 53X77IN

## (undated) DEVICE — SUT 0 VICRYL / CT-1

## (undated) DEVICE — PADDING WYTEX UNDRCST 6INX4YD

## (undated) DEVICE — GOWN POLY REINF BRTH SLV XL

## (undated) DEVICE — UNDERGLOVES BIOGEL PI SIZE 8.5

## (undated) DEVICE — ALCOHOL 70% ISOP RUBBING 4OZ

## (undated) DEVICE — APPLICATOR CHLORAPREP ORN 26ML

## (undated) DEVICE — PADDING CAST 4IN SPECIALIST

## (undated) DEVICE — LINER SUCTION 3000CC

## (undated) DEVICE — STRAP OR TABLE 5IN X 72IN

## (undated) DEVICE — SUT ETHILON 3/0 18IN PS-1

## (undated) DEVICE — PAD PERI POST REPLACEMNT

## (undated) DEVICE — BANDAGE MATRIX HK LOOP 6IN 5YD

## (undated) DEVICE — SUT 2-0 VICRYL / CT-1

## (undated) DEVICE — DRESSING AQUACEL AG SILVER 4X4

## (undated) DEVICE — TAPE CURAD SILK ADH 3INX10YD

## (undated) DEVICE — IMPLANTABLE DEVICE
Type: IMPLANTABLE DEVICE | Site: FEMUR | Status: NON-FUNCTIONAL
Removed: 2023-04-08

## (undated) DEVICE — DRAPE C ARM 42 X 120 10/BX

## (undated) DEVICE — BNDG COFLEX FOAM LF2 ST 6X5YD

## (undated) DEVICE — TOWEL OR DISP STRL BLUE 4/PK

## (undated) DEVICE — DRAPE IOBAN 2 STERI

## (undated) DEVICE — COVER PROXIMA MAYO STAND

## (undated) DEVICE — WIRE GUIDE 3.2MM 400MM
Type: IMPLANTABLE DEVICE | Site: FEMUR | Status: NON-FUNCTIONAL
Removed: 2023-04-08

## (undated) DEVICE — SOL 9P NACL IRR PIC IL

## (undated) DEVICE — PACK CUSTOM UNIV BASIN SLI

## (undated) DEVICE — DRAPE C-ARMOR EQUIPMENT COVER

## (undated) DEVICE — DRAPE STERI U-SHAPED 47X51IN